# Patient Record
Sex: FEMALE | Race: WHITE | NOT HISPANIC OR LATINO | Employment: FULL TIME | URBAN - METROPOLITAN AREA
[De-identification: names, ages, dates, MRNs, and addresses within clinical notes are randomized per-mention and may not be internally consistent; named-entity substitution may affect disease eponyms.]

---

## 2017-01-20 ENCOUNTER — APPOINTMENT (OUTPATIENT)
Dept: PREADMISSION TESTING | Facility: HOSPITAL | Age: 52
End: 2017-01-20

## 2017-01-20 VITALS
OXYGEN SATURATION: 97 % | HEIGHT: 66 IN | SYSTOLIC BLOOD PRESSURE: 124 MMHG | HEART RATE: 66 BPM | BODY MASS INDEX: 29.54 KG/M2 | TEMPERATURE: 97.7 F | WEIGHT: 183.8 LBS | RESPIRATION RATE: 16 BRPM | DIASTOLIC BLOOD PRESSURE: 83 MMHG

## 2017-01-20 LAB
ALBUMIN SERPL-MCNC: 4.5 G/DL (ref 3.5–5.2)
ALBUMIN/GLOB SERPL: 1.6 G/DL
ALP SERPL-CCNC: 63 U/L (ref 39–117)
ALT SERPL W P-5'-P-CCNC: 16 U/L (ref 1–33)
ANION GAP SERPL CALCULATED.3IONS-SCNC: 14.2 MMOL/L
AST SERPL-CCNC: 12 U/L (ref 1–32)
BASOPHILS # BLD AUTO: 0.03 10*3/MM3 (ref 0–0.2)
BASOPHILS NFR BLD AUTO: 0.3 % (ref 0–1.5)
BILIRUB SERPL-MCNC: 0.5 MG/DL (ref 0.1–1.2)
BUN BLD-MCNC: 12 MG/DL (ref 6–20)
BUN/CREAT SERPL: 15.6 (ref 7–25)
CALCIUM SPEC-SCNC: 9.5 MG/DL (ref 8.6–10.5)
CHLORIDE SERPL-SCNC: 99 MMOL/L (ref 98–107)
CO2 SERPL-SCNC: 25.8 MMOL/L (ref 22–29)
CREAT BLD-MCNC: 0.77 MG/DL (ref 0.57–1)
DEPRECATED RDW RBC AUTO: 41.8 FL (ref 37–54)
EOSINOPHIL # BLD AUTO: 0.11 10*3/MM3 (ref 0–0.7)
EOSINOPHIL NFR BLD AUTO: 1.2 % (ref 0.3–6.2)
ERYTHROCYTE [DISTWIDTH] IN BLOOD BY AUTOMATED COUNT: 12.5 % (ref 11.7–13)
GFR SERPL CREATININE-BSD FRML MDRD: 78 ML/MIN/1.73
GLOBULIN UR ELPH-MCNC: 2.8 GM/DL
GLUCOSE BLD-MCNC: 96 MG/DL (ref 65–99)
HCT VFR BLD AUTO: 43 % (ref 35.6–45.5)
HGB BLD-MCNC: 14.6 G/DL (ref 11.9–15.5)
IMM GRANULOCYTES # BLD: 0.07 10*3/MM3 (ref 0–0.03)
IMM GRANULOCYTES NFR BLD: 0.8 % (ref 0–0.5)
LYMPHOCYTES # BLD AUTO: 2.79 10*3/MM3 (ref 0.9–4.8)
LYMPHOCYTES NFR BLD AUTO: 30.5 % (ref 19.6–45.3)
MCH RBC QN AUTO: 31.7 PG (ref 26.9–32)
MCHC RBC AUTO-ENTMCNC: 34 G/DL (ref 32.4–36.3)
MCV RBC AUTO: 93.3 FL (ref 80.5–98.2)
MONOCYTES # BLD AUTO: 0.75 10*3/MM3 (ref 0.2–1.2)
MONOCYTES NFR BLD AUTO: 8.2 % (ref 5–12)
NEUTROPHILS # BLD AUTO: 5.39 10*3/MM3 (ref 1.9–8.1)
NEUTROPHILS NFR BLD AUTO: 59 % (ref 42.7–76)
PLATELET # BLD AUTO: 249 10*3/MM3 (ref 140–500)
PMV BLD AUTO: 9.8 FL (ref 6–12)
POTASSIUM BLD-SCNC: 3.8 MMOL/L (ref 3.5–5.2)
PROT SERPL-MCNC: 7.3 G/DL (ref 6–8.5)
RBC # BLD AUTO: 4.61 10*6/MM3 (ref 3.9–5.2)
SODIUM BLD-SCNC: 139 MMOL/L (ref 136–145)
WBC NRBC COR # BLD: 9.14 10*3/MM3 (ref 4.5–10.7)

## 2017-01-20 PROCEDURE — 93005 ELECTROCARDIOGRAM TRACING: CPT

## 2017-01-20 PROCEDURE — 85025 COMPLETE CBC W/AUTO DIFF WBC: CPT | Performed by: ORTHOPAEDIC SURGERY

## 2017-01-20 PROCEDURE — 80053 COMPREHEN METABOLIC PANEL: CPT | Performed by: ORTHOPAEDIC SURGERY

## 2017-01-20 PROCEDURE — 36415 COLL VENOUS BLD VENIPUNCTURE: CPT

## 2017-01-20 PROCEDURE — 93010 ELECTROCARDIOGRAM REPORT: CPT | Performed by: INTERNAL MEDICINE

## 2017-01-20 NOTE — DISCHARGE INSTRUCTIONS
YOU WILL RECEIVE A CALL THE DAY BEFORE YOUR PROCEDURE AND GIVEN AN ARRIVAL TIME FOR 1/25/2017      Take the following medications the morning of surgery with a small sip of water.  NO MEDS        General Instructions:  • Do not eat or drink after midnight: includes water, mints, or gum. You may brush your teeth.  • Do not smoke, chew tobacco, or drink alcohol.  • Bring medications in original bottles, any inhalers and if applicable your C-PAP/ BI-PAP machine.  • Bring any papers given to you in the doctor’s office.  • Wear clean comfortable clothes and socks.  • Do not wear contact lenses or make-up.  Bring a case for your glasses if applicable.   • Bring crutches or walker if applicable.  • Leave all other valuables and jewelry at home.        Preventing a Surgical Site Infection:  Shower on the morning of surgery using a fresh bar of anti-bacterial soap (such as Dial) and clean washcloth.  Dry with a clean towel and dress in clean clothing.  For 2 to 3 days before surgery, avoid shaving with a razor near where you will have surgery because the razor can irritate skin and make it easier to develop an infection  Ask your surgeon if you will be receiving antibiotics prior to surgery  Make sure you, your family, and all healthcare providers clean their hands with soap and water or an alcohol based hand  before caring for you or your wound  If at all possible, quit smoking as many days before surgery as you can.    Day of surgery:  Upon arrival, a Pre-op nurse and Anesthesiologist will review your health history, obtain vital signs, and answer questions you may have.  The only belongings needed at this time will be your home medications and if applicable your C-PAP/BI-PAP machine.  If you are staying overnight your family can leave the rest of your belongings in the car and bring them to your room later.  A Pre-op nurse will start an IV and you may receive medication in preparation for surgery, including  something to help you relax.  Your family will be able to see you in the Pre-op area.  While you are in surgery your family should notify the waiting room  if they leave the waiting room area and provide a contact phone number.    Please be aware that surgery does come with discomfort.  We want to make every effort to control your discomfort so please discuss any uncontrolled symptoms with your nurse.   Your doctor will most likely have prescribed pain medications.      If you are going home after surgery you will receive individualized written care instructions before being discharged.  A responsible adult must drive you to and from the hospital on the day of your surgery and stay with you for 24 hours.    If you are staying overnight following surgery, you will be transported to your hospital room following the recovery period.  [facility] has all private rooms.    If you have any questions please call Pre-Admission Testing at [phone].  Deductibles and co-payments are collected on the day of service. Please be prepared to pay the required co-pay, deductible or deposit on the day of service as defined by your plan.

## 2017-01-20 NOTE — MR AVS SNAPSHOT
"                        Alem Arzate   1/20/2017 9:30 AM   Appointment    Provider:  VIVIANA Ballesteros   Department:  [facility] PREADMISSION T   Dept Phone:  [phone]                Your Full Care Plan              Your Updated Medication List          This list is accurate as of: 1/20/17  9:42 AM.  Always use your most recent med list.                MULTI COMPLETE PO               MyChart Signup     Our records indicate that you have an active [facility] [employer]Yu account.    You can view your After Visit Summary by going to [employer] and logging in with your UniYu username and password.  If you don't have a UniYu username and password but a parent or guardian has access to your record, the parent or guardian should login with their own UniYu username and password and access your record to view the After Visit Summary.    If you have questions, you can email [email or web address] or call [phone] to talk to our UniYu staff.  Remember, UniYu is NOT to be used for urgent needs.  For medical emergencies, dial 911.               Other Info from Your Visit           Allergies     Codeine Nausea Only      Vital Signs     Blood Pressure Pulse Temperature Respirations Height Weight    124/83 (BP Location: Right arm, Patient Position: Sitting) 66 97.7 °F (36.5 °C) (Oral) 16 66\" (167.6 cm) 183 lb 12.8 oz (83.4 kg)    Last Menstrual Period Oxygen Saturation Body Mass Index Smoking Status          12/10/2016 (Exact Date) 97% 29.67 kg/m2 Never Smoker          Discharge Instructions       YOU WILL RECEIVE A CALL THE DAY BEFORE YOUR PROCEDURE AND GIVEN AN ARRIVAL TIME FOR 1/25/2017      Take the following medications the morning of surgery with a small sip of water.  NO MEDS        General Instructions:  • Do not eat or drink after midnight: includes water, mints, or gum. You may brush your teeth.  • Do not smoke, chew tobacco, or drink alcohol.  • Bring medications in " original bottles, any inhalers and if applicable your C-PAP/ BI-PAP machine.  • Bring any papers given to you in the doctor’s office.  • Wear clean comfortable clothes and socks.  • Do not wear contact lenses or make-up.  Bring a case for your glasses if applicable.   • Bring crutches or walker if applicable.  • Leave all other valuables and jewelry at home.        Preventing a Surgical Site Infection:  Shower on the morning of surgery using a fresh bar of anti-bacterial soap (such as Dial) and clean washcloth.  Dry with a clean towel and dress in clean clothing.  For 2 to 3 days before surgery, avoid shaving with a razor near where you will have surgery because the razor can irritate skin and make it easier to develop an infection  Ask your surgeon if you will be receiving antibiotics prior to surgery  Make sure you, your family, and all healthcare providers clean their hands with soap and water or an alcohol based hand  before caring for you or your wound  If at all possible, quit smoking as many days before surgery as you can.    Day of surgery:  Upon arrival, a Pre-op nurse and Anesthesiologist will review your health history, obtain vital signs, and answer questions you may have.  The only belongings needed at this time will be your home medications and if applicable your C-PAP/BI-PAP machine.  If you are staying overnight your family can leave the rest of your belongings in the car and bring them to your room later.  A Pre-op nurse will start an IV and you may receive medication in preparation for surgery, including something to help you relax.  Your family will be able to see you in the Pre-op area.  While you are in surgery your family should notify the waiting room  if they leave the waiting room area and provide a contact phone number.    Please be aware that surgery does come with discomfort.  We want to make every effort to control your discomfort so please discuss any uncontrolled  symptoms with your nurse.   Your doctor will most likely have prescribed pain medications.      If you are going home after surgery you will receive individualized written care instructions before being discharged.  A responsible adult must drive you to and from the hospital on the day of your surgery and stay with you for 24 hours.    If you are staying overnight following surgery, you will be transported to your hospital room following the recovery period.  [facility] has all private rooms.    If you have any questions please call Pre-Admission Testing at [phone].  Deductibles and co-payments are collected on the day of service. Please be prepared to pay the required co-pay, deductible or deposit on the day of service as defined by your plan.       SYMPTOMS OF A STROKE    Call 911 or have someone take you to the Emergency Department if you have any of the following:    · Sudden numbness or weakness of your face, arm or leg especially on one side of the body  · Sudden confusion, diffiiculty speaking or trouble understanding   · Changes in your vision or loss of sight in one eye  · Sudden severe headache with no known cause  · sudden dizziness, trouble walking, loss of balance or coordination    It is important to seek emergency care right away if you have further stroke symptoms. If you get emergency help quickly, the powerful clot-dissolving medicines can reduce the disabilities caused by a stroke.     For more information:    American Stroke Association  1-918-9-STROKE  www.strokeassociation.org           IF YOU SMOKE OR USE TOBACCO PLEASE READ THE FOLLOWING:    Why is smoking bad for me?  Smoking increases the risk of heart disease, lung disease, vascular disease, stroke, and cancer.     If you smoke, STOP!    If you would like more information on quitting smoking, please visit the [facility] [employer] website: [email or web address]   This link will provide  additional resources including the QUIT line and the Beat the Pack support groups.     For more information:    American Cancer Society  ([phone] 522-5348    American Heart Association  1-125.956.4374

## 2017-01-25 ENCOUNTER — ANESTHESIA (OUTPATIENT)
Dept: PERIOP | Facility: HOSPITAL | Age: 52
End: 2017-01-25

## 2017-01-25 ENCOUNTER — ANESTHESIA EVENT (OUTPATIENT)
Dept: PERIOP | Facility: HOSPITAL | Age: 52
End: 2017-01-25

## 2017-01-25 ENCOUNTER — HOSPITAL ENCOUNTER (OUTPATIENT)
Facility: HOSPITAL | Age: 52
Setting detail: HOSPITAL OUTPATIENT SURGERY
Discharge: HOME | End: 2017-01-25
Attending: ORTHOPAEDIC SURGERY | Admitting: ORTHOPAEDIC SURGERY

## 2017-01-25 VITALS
OXYGEN SATURATION: 97 % | WEIGHT: 183 LBS | HEIGHT: 66 IN | BODY MASS INDEX: 29.41 KG/M2 | HEART RATE: 65 BPM | TEMPERATURE: 97.2 F | RESPIRATION RATE: 16 BRPM | DIASTOLIC BLOOD PRESSURE: 85 MMHG | SYSTOLIC BLOOD PRESSURE: 123 MMHG

## 2017-01-25 LAB
B-HCG UR QL: NEGATIVE
INTERNAL NEGATIVE CONTROL: NORMAL
INTERNAL POSITIVE CONTROL: NORMAL
Lab: NORMAL

## 2017-01-25 PROCEDURE — 25010000002 KETOROLAC TROMETHAMINE PER 15 MG: Performed by: NURSE ANESTHETIST, CERTIFIED REGISTERED

## 2017-01-25 PROCEDURE — 25010000002 MIDAZOLAM PER 1 MG: Performed by: ANESTHESIOLOGY

## 2017-01-25 PROCEDURE — 25010000002 PROPOFOL 10 MG/ML EMULSION: Performed by: NURSE ANESTHETIST, CERTIFIED REGISTERED

## 2017-01-25 PROCEDURE — 25010000002 ONDANSETRON PER 1 MG: Performed by: NURSE ANESTHETIST, CERTIFIED REGISTERED

## 2017-01-25 PROCEDURE — 25010000002 FENTANYL CITRATE (PF) 100 MCG/2ML SOLUTION: Performed by: NURSE ANESTHETIST, CERTIFIED REGISTERED

## 2017-01-25 PROCEDURE — 25010000003 CEFAZOLIN IN DEXTROSE 2-4 GM/100ML-% SOLUTION: Performed by: ORTHOPAEDIC SURGERY

## 2017-01-25 PROCEDURE — 25010000002 DEXAMETHASONE PER 1 MG: Performed by: NURSE ANESTHETIST, CERTIFIED REGISTERED

## 2017-01-25 RX ORDER — BUPIVACAINE HYDROCHLORIDE AND EPINEPHRINE 5; 5 MG/ML; UG/ML
INJECTION, SOLUTION PERINEURAL AS NEEDED
Status: DISCONTINUED | OUTPATIENT
Start: 2017-01-25 | End: 2017-01-25 | Stop reason: HOSPADM

## 2017-01-25 RX ORDER — HYDRALAZINE HYDROCHLORIDE 20 MG/ML
5 INJECTION INTRAMUSCULAR; INTRAVENOUS
Status: DISCONTINUED | OUTPATIENT
Start: 2017-01-25 | End: 2017-01-26 | Stop reason: HOSPADM

## 2017-01-25 RX ORDER — DIPHENHYDRAMINE HYDROCHLORIDE 50 MG/ML
12.5 INJECTION INTRAMUSCULAR; INTRAVENOUS
Status: DISCONTINUED | OUTPATIENT
Start: 2017-01-25 | End: 2017-01-26 | Stop reason: HOSPADM

## 2017-01-25 RX ORDER — LIDOCAINE HYDROCHLORIDE 20 MG/ML
INJECTION, SOLUTION INFILTRATION; PERINEURAL AS NEEDED
Status: DISCONTINUED | OUTPATIENT
Start: 2017-01-25 | End: 2017-01-25 | Stop reason: SURG

## 2017-01-25 RX ORDER — HYDROMORPHONE HYDROCHLORIDE 1 MG/ML
0.5 INJECTION, SOLUTION INTRAMUSCULAR; INTRAVENOUS; SUBCUTANEOUS
Status: DISCONTINUED | OUTPATIENT
Start: 2017-01-25 | End: 2017-01-26 | Stop reason: HOSPADM

## 2017-01-25 RX ORDER — KETOROLAC TROMETHAMINE 30 MG/ML
INJECTION, SOLUTION INTRAMUSCULAR; INTRAVENOUS AS NEEDED
Status: DISCONTINUED | OUTPATIENT
Start: 2017-01-25 | End: 2017-01-25 | Stop reason: SURG

## 2017-01-25 RX ORDER — PROMETHAZINE HYDROCHLORIDE 25 MG/1
25 SUPPOSITORY RECTAL ONCE AS NEEDED
Status: DISCONTINUED | OUTPATIENT
Start: 2017-01-25 | End: 2017-01-26 | Stop reason: HOSPADM

## 2017-01-25 RX ORDER — MIDAZOLAM HYDROCHLORIDE 1 MG/ML
2 INJECTION INTRAMUSCULAR; INTRAVENOUS
Status: DISCONTINUED | OUTPATIENT
Start: 2017-01-25 | End: 2017-01-25 | Stop reason: HOSPADM

## 2017-01-25 RX ORDER — PROMETHAZINE HYDROCHLORIDE 25 MG/ML
12.5 INJECTION, SOLUTION INTRAMUSCULAR; INTRAVENOUS ONCE AS NEEDED
Status: DISCONTINUED | OUTPATIENT
Start: 2017-01-25 | End: 2017-01-26 | Stop reason: HOSPADM

## 2017-01-25 RX ORDER — LABETALOL HYDROCHLORIDE 5 MG/ML
5 INJECTION, SOLUTION INTRAVENOUS
Status: DISCONTINUED | OUTPATIENT
Start: 2017-01-25 | End: 2017-01-26 | Stop reason: HOSPADM

## 2017-01-25 RX ORDER — PROPOFOL 10 MG/ML
VIAL (ML) INTRAVENOUS AS NEEDED
Status: DISCONTINUED | OUTPATIENT
Start: 2017-01-25 | End: 2017-01-25 | Stop reason: SURG

## 2017-01-25 RX ORDER — FENTANYL CITRATE 50 UG/ML
50 INJECTION, SOLUTION INTRAMUSCULAR; INTRAVENOUS
Status: DISCONTINUED | OUTPATIENT
Start: 2017-01-25 | End: 2017-01-25 | Stop reason: HOSPADM

## 2017-01-25 RX ORDER — ONDANSETRON 2 MG/ML
4 INJECTION INTRAMUSCULAR; INTRAVENOUS ONCE AS NEEDED
Status: DISCONTINUED | OUTPATIENT
Start: 2017-01-25 | End: 2017-01-26 | Stop reason: HOSPADM

## 2017-01-25 RX ORDER — SODIUM CHLORIDE, SODIUM LACTATE, POTASSIUM CHLORIDE, AND CALCIUM CHLORIDE .6; .31; .03; .02 G/100ML; G/100ML; G/100ML; G/100ML
IRRIGANT IRRIGATION AS NEEDED
Status: DISCONTINUED | OUTPATIENT
Start: 2017-01-25 | End: 2017-01-25 | Stop reason: HOSPADM

## 2017-01-25 RX ORDER — PROMETHAZINE HYDROCHLORIDE 25 MG/1
25 TABLET ORAL ONCE AS NEEDED
Status: DISCONTINUED | OUTPATIENT
Start: 2017-01-25 | End: 2017-01-26 | Stop reason: HOSPADM

## 2017-01-25 RX ORDER — PROMETHAZINE HYDROCHLORIDE 25 MG/1
12.5 TABLET ORAL ONCE AS NEEDED
Status: DISCONTINUED | OUTPATIENT
Start: 2017-01-25 | End: 2017-01-26 | Stop reason: HOSPADM

## 2017-01-25 RX ORDER — DEXAMETHASONE SODIUM PHOSPHATE 10 MG/ML
INJECTION INTRAMUSCULAR; INTRAVENOUS AS NEEDED
Status: DISCONTINUED | OUTPATIENT
Start: 2017-01-25 | End: 2017-01-25 | Stop reason: SURG

## 2017-01-25 RX ORDER — FENTANYL CITRATE 50 UG/ML
INJECTION, SOLUTION INTRAMUSCULAR; INTRAVENOUS AS NEEDED
Status: DISCONTINUED | OUTPATIENT
Start: 2017-01-25 | End: 2017-01-25 | Stop reason: SURG

## 2017-01-25 RX ORDER — HYDROMORPHONE HYDROCHLORIDE 1 MG/ML
0.25 INJECTION, SOLUTION INTRAMUSCULAR; INTRAVENOUS; SUBCUTANEOUS
Status: DISCONTINUED | OUTPATIENT
Start: 2017-01-25 | End: 2017-01-26 | Stop reason: HOSPADM

## 2017-01-25 RX ORDER — HYDROCODONE BITARTRATE AND ACETAMINOPHEN 5; 325 MG/1; MG/1
1-2 TABLET ORAL EVERY 4 HOURS PRN
Qty: 40 TABLET | Refills: 0 | Status: SHIPPED | OUTPATIENT
Start: 2017-01-25 | End: 2019-07-29

## 2017-01-25 RX ORDER — SODIUM CHLORIDE, SODIUM LACTATE, POTASSIUM CHLORIDE, CALCIUM CHLORIDE 600; 310; 30; 20 MG/100ML; MG/100ML; MG/100ML; MG/100ML
9 INJECTION, SOLUTION INTRAVENOUS CONTINUOUS
Status: DISCONTINUED | OUTPATIENT
Start: 2017-01-25 | End: 2017-01-26 | Stop reason: HOSPADM

## 2017-01-25 RX ORDER — OXYCODONE AND ACETAMINOPHEN 7.5; 325 MG/1; MG/1
1 TABLET ORAL ONCE AS NEEDED
Status: COMPLETED | OUTPATIENT
Start: 2017-01-25 | End: 2017-01-25

## 2017-01-25 RX ORDER — NALOXONE HCL 0.4 MG/ML
0.2 VIAL (ML) INJECTION AS NEEDED
Status: DISCONTINUED | OUTPATIENT
Start: 2017-01-25 | End: 2017-01-26 | Stop reason: HOSPADM

## 2017-01-25 RX ORDER — ONDANSETRON 2 MG/ML
INJECTION INTRAMUSCULAR; INTRAVENOUS AS NEEDED
Status: DISCONTINUED | OUTPATIENT
Start: 2017-01-25 | End: 2017-01-25 | Stop reason: SURG

## 2017-01-25 RX ORDER — FENTANYL CITRATE 50 UG/ML
50 INJECTION, SOLUTION INTRAMUSCULAR; INTRAVENOUS
Status: DISCONTINUED | OUTPATIENT
Start: 2017-01-25 | End: 2017-01-26 | Stop reason: HOSPADM

## 2017-01-25 RX ORDER — CEFAZOLIN SODIUM 2 G/100ML
2 INJECTION, SOLUTION INTRAVENOUS ONCE
Status: COMPLETED | OUTPATIENT
Start: 2017-01-25 | End: 2017-01-25

## 2017-01-25 RX ORDER — MIDAZOLAM HYDROCHLORIDE 1 MG/ML
1 INJECTION INTRAMUSCULAR; INTRAVENOUS
Status: DISCONTINUED | OUTPATIENT
Start: 2017-01-25 | End: 2017-01-25 | Stop reason: HOSPADM

## 2017-01-25 RX ORDER — FAMOTIDINE 10 MG/ML
20 INJECTION, SOLUTION INTRAVENOUS ONCE
Status: COMPLETED | OUTPATIENT
Start: 2017-01-25 | End: 2017-01-25

## 2017-01-25 RX ORDER — SODIUM CHLORIDE 0.9 % (FLUSH) 0.9 %
1-10 SYRINGE (ML) INJECTION AS NEEDED
Status: DISCONTINUED | OUTPATIENT
Start: 2017-01-25 | End: 2017-01-25 | Stop reason: HOSPADM

## 2017-01-25 RX ORDER — HYDROCODONE BITARTRATE AND ACETAMINOPHEN 7.5; 325 MG/1; MG/1
1 TABLET ORAL ONCE AS NEEDED
Status: DISCONTINUED | OUTPATIENT
Start: 2017-01-25 | End: 2017-01-26 | Stop reason: HOSPADM

## 2017-01-25 RX ORDER — FLUMAZENIL 0.1 MG/ML
0.2 INJECTION INTRAVENOUS AS NEEDED
Status: DISCONTINUED | OUTPATIENT
Start: 2017-01-25 | End: 2017-01-26 | Stop reason: HOSPADM

## 2017-01-25 RX ADMIN — OXYCODONE HYDROCHLORIDE AND ACETAMINOPHEN 1 TABLET: 7.5; 325 TABLET ORAL at 12:02

## 2017-01-25 RX ADMIN — SODIUM CHLORIDE, POTASSIUM CHLORIDE, SODIUM LACTATE AND CALCIUM CHLORIDE 9 ML/HR: 600; 310; 30; 20 INJECTION, SOLUTION INTRAVENOUS at 10:19

## 2017-01-25 RX ADMIN — LIDOCAINE HYDROCHLORIDE 100 MG: 20 INJECTION, SOLUTION INFILTRATION; PERINEURAL at 10:34

## 2017-01-25 RX ADMIN — FAMOTIDINE 20 MG: 10 INJECTION, SOLUTION INTRAVENOUS at 10:25

## 2017-01-25 RX ADMIN — CEFAZOLIN SODIUM 2 G: 2 INJECTION, SOLUTION INTRAVENOUS at 10:31

## 2017-01-25 RX ADMIN — ONDANSETRON 4 MG: 2 INJECTION INTRAMUSCULAR; INTRAVENOUS at 10:44

## 2017-01-25 RX ADMIN — DEXAMETHASONE SODIUM PHOSPHATE 8 MG: 10 INJECTION INTRAMUSCULAR; INTRAVENOUS at 10:44

## 2017-01-25 RX ADMIN — MIDAZOLAM 2 MG: 1 INJECTION INTRAMUSCULAR; INTRAVENOUS at 10:26

## 2017-01-25 RX ADMIN — FENTANYL CITRATE 50 MCG: 50 INJECTION INTRAMUSCULAR; INTRAVENOUS at 10:40

## 2017-01-25 RX ADMIN — KETOROLAC TROMETHAMINE 30 MG: 30 INJECTION, SOLUTION INTRAMUSCULAR; INTRAVENOUS at 11:04

## 2017-01-25 RX ADMIN — PROPOFOL 200 MG: 10 INJECTION, EMULSION INTRAVENOUS at 10:34

## 2017-01-25 NOTE — IP AVS SNAPSHOT
AFTER VISIT SUMMARY             Alem Arzate           About your hospitalization     You were admitted on:  January 25, 2017 You last received care in the:  [facility] OR       Procedures & Surgeries      Procedure(s) (LRB):  LEFT KNEE ARTHROSCOPY WITH CHONDROPLASTY  AND  DRILLING (Left)     1/25/2017     Surgeon(s):  Colt Fournier MD  -------------------      Medications    If you or your caregiver advised us that you are currently taking a medication and that medication is marked below as “Resume”, this simply indicates that we have reviewed those medications to make sure our new therapy recommendations do not interfere.  If you have concerns about medications other than those new ones which we are prescribing today, please consult the physician who prescribed them (or your primary physician).  Our review of your home medications is not meant to indicate that we are directing their use.             Your Medications      START taking these medications     HYDROcodone-acetaminophen 5-325 MG per tablet   Take 1-2 tablets by mouth Every 4 (Four) Hours As Needed (pain).   Commonly known as:  NORCO             CONTINUE taking these medications     MULTI COMPLETE PO   Take 1 tablet by mouth Daily.                Where to Get Your Medications      You can get these medications from any pharmacy     Bring a paper prescription for each of these medications     HYDROcodone-acetaminophen 5-325 MG per tablet                  Your Medications      Your Medication List           Morning Noon Evening Bedtime As Needed    HYDROcodone-acetaminophen 5-325 MG per tablet   Take 1-2 tablets by mouth Every 4 (Four) Hours As Needed (pain).   Commonly known as:  NORCO                                MULTI COMPLETE PO   Take 1 tablet by mouth Daily.                                         Instructions for After Discharge        Activity Instructions     Discharge Activity       1) No driving until no longer taking  narcotics.   2) weight bear as tolerated with crutches  3) May shower / sponge bathe post op day 2               Discharge References/Attachments     GENERAL ANESTHESIA, ADULT, CARE AFTER (ENGLISH)       Follow-ups for After Discharge        Follow-up Information     Follow up with Chad Uribe MD .    Specialty:  Family Medicine    Contact information:    [address] [place] [address]  [phone]        Referrals and Follow-ups to Schedule     Dressing Change Instructions    As directed    Dressing change: remove dressing after 24 hours and then cover with band aids daily  Ice:  You may ice 20-30 minutes every 2-3 hours       Return to Clinic for Follow Up    As directed    Call [facility] to schedule your follow up appointment   Follow Up Details:  1 week             [employer] Signup     Our records indicate that you have an active [employer] account.    You can view your After Visit Summary by going to [employer] and logging in with your Starfish [employer] username and password.  If you don't have a Star[employer] username and password but a parent or guardian has access to your record, the parent or guardian should login with their own Star[employer] username and password and access your record to view the After Visit Summary.    If you have questions, you can email [email or web address] or call [phone] to talk to our Star[employer] staff.  Remember, [employer] is NOT to be used for urgent needs.  For medical emergencies, dial 911.           Summary of Your Hospitalization        Reason for Hospitalization     Your primary diagnosis was:  Not on File      Care Providers     Provider Service Role Specialty    Colt Fournier MD Surgery Orthopedic Attending Provider Orthopedic Surgery    Colt Fournier MD Surgery Orthopedic Surgeon  Orthopedic Surgery      Your Allergies  Date Reviewed: 1/25/2017    Allergen Reactions    Codeine Nausea Only      Pending Labs      Order Current Status    POC Urine Pregnancy Test In process      Patient Belongings Returned     Document Return of Belongings Flowsheet     Were the patient bedside belongings sent home?   --   Belongings Retrieved from Security & Sent Home   --    Belongings Sent to Safe   --   Medications Retrieved from Pharmacy & Sent Home   --              More Information      General Anesthesia, Adult, Care After  Refer to this sheet in the next few weeks. These instructions provide you with information on caring for yourself after your procedure. Your health care provider may also give you more specific instructions. Your treatment has been planned according to current medical practices, but problems sometimes occur. Call your health care provider if you have any problems or questions after your procedure.  WHAT TO EXPECT AFTER THE PROCEDURE  After the procedure, it is typical to experience:  · Sleepiness.  · Nausea and vomiting.  HOME CARE INSTRUCTIONS  · For the first 24 hours after general anesthesia:  ¨ Have a responsible person with you.  ¨ Do not drive a car. If you are alone, do not take public transportation.  ¨ Do not drink alcohol.  ¨ Do not take medicine that has not been prescribed by your health care provider.  ¨ Do not sign important papers or make important decisions.  ¨ You may resume a normal diet and activities as directed by your health care provider.  · Change bandages (dressings) as directed.  · If you have questions or problems that seem related to general anesthesia, call the hospital and ask for the anesthetist or anesthesiologist on call.  SEEK MEDICAL CARE IF:  · You have nausea and vomiting that continue the day after anesthesia.  · You develop a rash.  SEEK IMMEDIATE MEDICAL CARE IF:   · You have difficulty breathing.  · You have chest pain.  · You have any allergic problems.     This information is not intended to replace advice given to you by your health care provider. Make sure you discuss  any questions you have with your health care provider.     Document Released: 03/26/2002 Document Revised: 01/08/2016 Document Reviewed: 04/17/2013  [employer] Interactive Patient Education ©2016 [employer]         PREVENTING SURGICAL SITE INFECTIONS     Surgical Site Infections FAQs  What is a Surgical Site Infection (SSI)?  A surgical site infection is an infection that occurs after surgery in the part of the body where the surgery took place. Most patients who have surgery do not develop an infection. However, infections develop in about 1 to 3 out of every 100 patients who have surgery.  Some of the common symptoms of a surgical site infection are:  · Redness and pain around the area where you had surgery  · Drainage of cloudy fluid from your surgical wound  · Fever  Can SSIs be treated?  Yes. Most surgical site infections can be treated with antibiotics. The antibiotic given to you depends on the bacteria (germs) causing the infection. Sometimes patients with SSIs also need another surgery to treat the infection.  What are some of the things that hospitals are doing to prevent SSIs?  To prevent SSIs, doctors, nurses, and other healthcare providers:  · Clean their hands and arms up to their elbows with an antiseptic agent just before the surgery.  · Clean their hands with soap and water or an alcohol-based hand rub before and after caring for each patient.  · May remove some of your hair immediately before your surgery using electric clippers if the hair is in the same area where the procedure will occur. They should not shave you with a razor.  · Wear special hair covers, masks, gowns, and gloves during surgery to keep the surgery area clean.  · Give you antibiotics before your surgery starts. In most cases, you should get antibiotics within 60 minutes before the surgery starts and the antibiotics should be stopped within 24 hours after surgery.  · Clean the skin at the site of your surgery with a special soap  that kills germs.  What can I do to help prevent SSIs?  Before your surgery:  · Tell your doctor about other medical problems you may have. Health problems such as allergies, diabetes, and obesity could affect your surgery and your treatment.  · Quit smoking. Patients who smoke get more infections. Talk to your doctor about how you can quit before your surgery.  · Do not shave near where you will have surgery. Shaving with a razor can irritate your skin and make it easier to develop an infection.  At the time of your surgery:  · Speak up if someone tries to shave you with a razor before surgery. Ask why you need to be shaved and talk with your surgeon if you have any concerns.  · Ask if you will get antibiotics before surgery.  After your surgery:  · Make sure that your healthcare providers clean their hands before examining you, either with soap and water or an alcohol-based hand rub.    If you do not see your providers clean their hands, please ask them to do so.  · Family and friends who visit you should not touch the surgical wound or dressings.  · Family and friends should clean their hands with soap and water or an alcohol-based hand rub before and after visiting you. If you do not see them clean their hands, ask them to clean their hands.  What do I need to do when I go home from the hospital?  · Before you go home, your doctor or nurse should explain everything you need to know about taking care of your wound. Make sure you understand how to care for your wound before you leave the hospital.  · Always clean your hands before and after caring for your wound.  · Before you go home, make sure you know who to contact if you have questions or problems after you get home.  · If you have any symptoms of an infection, such as redness and pain at the surgery site, drainage, or fever, call your doctor immediately.  If you have additional questions, please ask your doctor or nurse.  Developed and co-sponsored by The  Society for Healthcare Epidemiology of [place] (SHEA); Infectious Diseases Society of [place] (IDSA); American Hospital Association; Association for Professionals in Infection Control and Epidemiology (APIC); Centers for Disease Control and Prevention (CDC); and The Joint Commission.     This information is not intended to replace advice given to you by your health care provider. Make sure you discuss any questions you have with your health care provider.     Document Released: 12/23/2014 Document Revised: 01/08/2016 Document Reviewed: 03/02/2016  [employer]System[employer] Interactive Patient Education ©2016 [employer] Inc.             SYMPTOMS OF A STROKE    Call 911 or have someone take you to the Emergency Department if you have any of the following:    · Sudden numbness or weakness of your face, arm or leg especially on one side of the body  · Sudden confusion, diffiiculty speaking or trouble understanding   · Changes in your vision or loss of sight in one eye  · Sudden severe headache with no known cause  · sudden dizziness, trouble walking, loss of balance or coordination    It is important to seek emergency care right away if you have further stroke symptoms. If you get emergency help quickly, the powerful clot-dissolving medicines can reduce the disabilities caused by a stroke.     For more information:    American Stroke Association  9-316-7-STROKE  www.strokeassociation.org           IF YOU SMOKE OR USE TOBACCO PLEASE READ THE FOLLOWING:    Why is smoking bad for me?  Smoking increases the risk of heart disease, lung disease, vascular disease, stroke, and cancer.     If you smoke, STOP!    If you would like more information on quitting smoking, please visit the [employer] website: [email or web address]   This link will provide additional resources including the QUIT line and the Beat the Pack support groups.     For more information:    American Cancer Society  (502)  154-8130    American Heart Association  5-580-443-2874               YOU ARE THE MOST IMPORTANT FACTOR IN YOUR RECOVERY.     Follow all instructions carefully.     I have reviewed my discharge instructions with my nurse, including the following information, if applicable:     Information about my illness and diagnosis   Follow up appointments (including lab draws)   Wound Care   Equipment Needs   Medications (new and continuing) along with side effects   Preventative information such as vaccines and smoking cessations   Diet   Pain   I know when to contact my Doctor's office or seek emergency care      I want my nurse to describe the side effects of my medications: YES NO   If the answer is no, I understand the side effects of my medications: YES NO   My nurse described the side effects of my medications in a way that I could understand: YES NO   I have taken my personal belongings and my own medications with me at discharge: YES NO            I have received this information and my questions have been answered. I have discussed any concerns I see with this plan with the nurse or physician. I understand these instructions.    Signature of Patient or Responsible Person: _____________________________________    Date: _________________  Time: __________________    Signature of Healthcare Provider: _______________________________________  Date: _________________  Time: __________________

## 2017-01-25 NOTE — OP NOTE
DATE OF PROCEDURE:  01/25/2017    PREOPERATIVE DIAGNOSIS:  Chondromalacia of patella.     POSTOPERATIVE DIAGNOSES:  1.  Chondromalacia of the patella.   2.  Chondral fracture medial femoral condyle     PROCEDURES PERFORMED:  1.  Left knee arthroscopy.   2.  Chondroplasty with trephinations of femoral condyle.   3.  Extensor chondroplasty of patellofemoral joint.     SURGEON: Colt Fournier MD      ASSISTANT: None.    ANESTHESIA: General, plus postoperative local. Tourniquet time: 18 minutes.     COMPLICATIONS: None.     SPECIMENS: None sent.     DESCRIPTION OF PROCEDURE: Following induction of anesthesia, the patient had a tourniquet placed on the left upper thigh. The leg was placed in a leg ang and prepped and draped in a sterile fashion. We then wrapped the entire limb with elastic bands and inflated the tourniquet to 250 mmHg pressure. I first established the inferolateral portal and inserted the scope into the joint. She was found to have extensive chondromalacia of the femoral trochlea and the medial and lateral facets of the patella. I established the medial portal and inserted the shaver and essentially debrided the chondral flaps from both the femoral trochlea and the undersurface of the patella. I also removed a portion of the fat pad to allow better visualization of the medial compartment. She was found to have an intact meniscus, but there was a chondral fracture in the anterior weight-bearing aspect of the medial femoral condyle. I debrided this and then performed a trephinations was a small chondral pick performing 4  linear trephinations and then debriding again using the shaver.  The lateral meniscus was also found to be intact. I then removed the cannulas and closed the portals with 3-0 nylon and infiltrating the knee with a solution of Marcaine and morphine. She was placed into a soft, sterile dressing and into her postop ice wrap. She will be discharged to recovery and then to home. Prognosis  is good.          Colt Fournier M.D.*  TER:mamie  D:   01/25/2017 11:18:13  T:   01/25/2017 14:44:20  Job ID:   [id number]  Document ID:   [id number]  cc:

## 2017-01-25 NOTE — PLAN OF CARE
Problem: Patient Care Overview (Adult)  Goal: Plan of Care Review  Outcome: Ongoing (interventions implemented as appropriate)    01/25/17 1210   Coping/Psychosocial Response Interventions   Plan Of Care Reviewed With patient   Patient Care Overview   Progress improving       Goal: Adult Individualization and Mutuality  Outcome: Ongoing (interventions implemented as appropriate)  Goal: Discharge Needs Assessment  Outcome: Ongoing (interventions implemented as appropriate)    01/25/17 1210   Discharge Needs Assessment   Concerns To Be Addressed no discharge needs identified

## 2017-01-25 NOTE — PLAN OF CARE
Problem: Perioperative Period (Adult)  Goal: Signs and Symptoms of Listed Potential Problems Will be Absent or Manageable (Perioperative Period)  Outcome: Ongoing (interventions implemented as appropriate)    01/25/17 1209   Perioperative Period   Problems Assessed (Perioperative Period) all   Problems Present (Perioperative Period) none

## 2017-01-25 NOTE — ANESTHESIA PROCEDURE NOTES
Airway  Urgency: elective    Airway not difficult    General Information and Staff    Patient location during procedure: OR  Anesthesiologist: SASCHA BARKLEY  CRNA: SWAPNIL DERAS    Indications and Patient Condition  Indications for airway management: airway protection    Preoxygenated: yes  Mask difficulty assessment: 0 - not attempted    Final Airway Details  Final airway type: supraglottic airway      Successful airway: classic  Size 4    Number of attempts at approach: 1    Additional Comments  LMA inserted without difficulty.  Lips and teeth intact as preop condition.  No signs or symptoms of gastric regurgitation.  Seal with minimal pressure and secure.

## 2017-01-25 NOTE — ANESTHESIA PREPROCEDURE EVALUATION
Anesthesia Evaluation     Patient summary reviewed and Nursing notes reviewed    Airway   Mallampati: II  TM distance: <3 FB  Neck ROM: full  no difficulty expected  Dental - normal exam     Pulmonary - negative pulmonary ROS and normal exam   Cardiovascular - negative cardio ROS and normal exam    Rhythm: regular    Neuro/Psych- negative ROS  GI/Hepatic/Renal/Endo - negative ROS     Musculoskeletal     Abdominal  - normal exam    Bowel sounds: normal.   Substance History - negative use     OB/GYN negative ob/gyn ROS         Other   (+) arthritis                        Anesthesia Plan    ASA 1     general     intravenous induction   Anesthetic plan and risks discussed with patient.    Plan discussed with CRNA.

## 2017-01-25 NOTE — ANESTHESIA POSTPROCEDURE EVALUATION
Patient: Alme Arzate    Procedure Summary     Date Anesthesia Start Anesthesia Stop Room / Location    01/25/17 1031 1115  [facility]  /  [facility]       Procedure Diagnosis Surgeon Provider    LEFT KNEE ARTHROSCOPY WITH CHONDROPLASTY  AND  DRILLING (Left Knee) No diagnosis on file. MD Seven Marsh MD          Anesthesia Type: general  Last vitals  /85 (01/25/17 1243)    Temp      Pulse 65 (01/25/17 1243)   Resp 16 (01/25/17 1243)    SpO2 97 % (01/25/17 1243)      Post Anesthesia Care and Evaluation    Patient location during evaluation: bedside  Patient participation: complete - patient participated  Level of consciousness: awake  Pain score: 1  Pain management: adequate  Airway patency: patent  Anesthetic complications: No anesthetic complications    Cardiovascular status: acceptable  Respiratory status: acceptable  Hydration status: acceptable

## 2017-02-02 ENCOUNTER — HOSPITAL ENCOUNTER (OUTPATIENT)
Dept: PHYSICAL THERAPY | Facility: HOSPITAL | Age: 52
Setting detail: THERAPIES SERIES
Discharge: HOME | End: 2017-02-02
Attending: ORTHOPAEDIC SURGERY

## 2017-02-02 ENCOUNTER — TRANSCRIBE ORDERS (OUTPATIENT)
Dept: PHYSICAL THERAPY | Facility: HOSPITAL | Age: 52
End: 2017-02-02

## 2017-02-02 DIAGNOSIS — M25.562 ACUTE PAIN OF LEFT KNEE: Primary | ICD-10-CM

## 2017-02-02 DIAGNOSIS — R26.9 ABNORMAL GAIT: ICD-10-CM

## 2017-02-02 DIAGNOSIS — Z98.890 S/P LEFT KNEE ARTHROSCOPY: ICD-10-CM

## 2017-02-02 DIAGNOSIS — Z98.890 S/P KNEE SURGERY: Primary | ICD-10-CM

## 2017-02-02 PROCEDURE — 97161 PT EVAL LOW COMPLEX 20 MIN: CPT | Performed by: PHYSICAL THERAPIST

## 2017-02-02 PROCEDURE — 97110 THERAPEUTIC EXERCISES: CPT | Performed by: PHYSICAL THERAPIST

## 2017-02-02 NOTE — PROGRESS NOTES
Outpatient Physical Therapy Ortho Initial Evaluation  [facility]     Patient Name: Alem Arzate  : 1965  MRN: [id number]  Today's Date: 2017      Visit Date: 2017    There is no problem list on file for this patient.       Past Medical History   Diagnosis Date   • Acute meniscal tear of left knee    • Arthritis         Past Surgical History   Procedure Laterality Date   • Bladder repair     • Knee meniscal repair Right    • Tooth extraction     • Knee arthroscopy Left 2017     Procedure: LEFT KNEE ARTHROSCOPY WITH CHONDROPLASTY  AND  DRILLING;  Surgeon: Colt Fournier MD;  Location:  [facility];  Service:        Visit Dx:     ICD-10-CM ICD-9-CM   1. Acute pain of left knee M25.562 719.46   2. S/P left knee arthroscopy Z98.890 V45.89   3. Abnormal gait R26.9 781.2             Patient History       17 1500          History    Chief Complaint Difficulty Walking;Difficulty with daily activities;Joint stiffness;Joint swelling;Muscle weakness;Pain  -LH      Type of Pain Knee pain  -LH      Date Current Problem(s) Began 17  -      Brief Description of Current Complaint Pt was referreeing a basketball game and she turned on her L LE and she had a pop. She had a small meniscus tear. She got an injection from one MD and she continued with swelling. She went to another MD and they did the scope. She is a week post op and is walking without an Ad. She is taking meds at night for pain. He drained her knee today at her follow up appt  -      Previous treatment for THIS PROBLEM Injections  -      Onset Date- PT 17  -      Patient/Caregiver Goals Relieve pain;Return to prior level of function;Improve mobility;Improve strength  -      Occupation/sports/leisure activities [employer]  -LH      How has patient tried to help current problem? meds, ice  -LH      Surgery Date 1 17  -      Pain     Pain Location Knee  -      Pain at Present 4  -LH      Pain at Best 2   -      Pain at Worst 8  -      Pain Frequency Constant/continuous  -      Pain Description Aching;Stabbing  -      What Performance Factors Make the Current Problem(s) WORSE? walking, standing, bending  -      What Performance Factors Make the Current Problem(s) BETTER? ice, meds  -      Fall Risk Assessment    Any falls in the past year: No  -      Services    Prior Rehab/Home Health Experiences Yes  -      Daily Activities    Primary Language English  -      Teaching needs identified Home Exercise Program;Management of Condition  -      Patient is concerned about/has problems with Climbing Stairs;Performing home management (household chores, shopping, care of dependents);Performing job responsibilities/community activities (work, school,;Performing sports, recreation, and play activities;Standing;Walking  -      Pt Participated in POC and Goals Yes  -      Safety    Are you being hurt, hit, or frightened by anyone at home or in your life? No  -LH      Are you being neglected by a caregiver No  -        User Key  (r) = Recorded By, (t) = Taken By, (c) = Cosigned By    Initials Name Provider Type     Kierra Chavez, PT Physical Therapist                PT Ortho       02/02/17 1500    Posture/Observations    Observations Edema;Erythema;Incision healing  -    Sensation    Sensation WNL? WNL  -LH    Knee Palpation    Medial Joint Line Left:;Tender;Swollen  -    Medial Condyle Left:;Tender;Swollen  -    Left Knee    Extension/Flexion ROM Details ext=15 deg, flex=80 deg  -LH    Right Knee    Extension/Flexion ROM Details ext=0 deg, flex=125 deg  -    Left Hip    Hip Flexion Gross Movement (3+/5) fair plus  -LH    Hip ABduction Gross Movement (3+/5) fair plus  -    Right Hip    Hip Flexion Gross Movement (4+/5) good plus  -LH    Hip ABduction Gross Movement (4+/5) good plus  -LH    Left Knee    Knee Extension Gross Movement (3+/5) fair plus  -LH    Knee Flexion Gross Movement  (3+/5) fair plus  -LH    Right Knee    Knee Extension Gross Movement (5/5) normal  -LH    Knee Flexion Gross Movement (5/5) normal  -    Lower Extremity Flexibility    Hamstrings Left:;Mildly limited;Moderately limited  -LH    Gastrocnemius Left:;Mildly limited  -LH    Soleus Mildly limited  -LH    RLE Quick Girth (cm)    Tib tuberosity 35 cm  -LH    Mid patella 39 cm  -LH    Distal thigh 43 cm  -LH    LLE Quick Girth (cm)    Tib tuberosity 36.5 cm  -LH    Mid patella 42 cm  -LH    Distal thigh 46.5 cm  -LH    Gait Assessment/Treatment    Gait, Comment moderate antalgia without AD, decreased stance on L LE, dec TKE, dec heel.toe pattern  -      User Key  (r) = Recorded By, (t) = Taken By, (c) = Cosigned By    Initials Name Provider Type     Kierra Chavez, PT Physical Therapist                            Therapy Education       02/02/17 1624    Therapy Education    Given --   suggested pt use single crutch for gait  -      02/02/17 1610    Therapy Education    Given HEP;Symptoms/condition management;Edema management;Mobility training;Pain management  -    Program New  [facility]    How Provided Verbal;Demonstration;Written  -    Provided to Patient  -    Level of Understanding Teach back education performed;Verbalized;Demonstrated  -      User Key  (r) = Recorded By, (t) = Taken By, (c) = Cosigned By    Initials Name Provider Type     Kierra Chavez, PT Physical Therapist                PT OP Goals       02/02/17 1600          PT Short Term Goals    STG 1 Patient will be independent with education for symptom management, joint protection and strategies to minimize stress on affected tissues  -      STG 1 Progress New  [facility]      STG 2 Patient will report decreased pain rating on VAS from  8/10 to  4/10 with ADLs and household activities.  -      STG 2 Progress [facility]      STG 3 Pt to improve knee ROM to ext=8 deg and flex=105 deg for improved gait pattern  -      STG 3 Progress [facility]  4 Patient will ambulate without assistive device community distances with near normal gait  -      STG 4 Progress New  -      Long Term Goals    LTG 1 Pt to improve knee ROM to ext=0 deg and flex=120 deg for improved gait pattern  -      LTG 1 Progress New  -      LTG 2 Patient will increase knee strength to 4+/5 to improve functional mobility  -      LTG 2 Progress New  -      LTG 3 Pt to improve score on Knee Outcome Survey by 40% for overall functional improvement  -      LTG 3 Progress New  -      LTG 4 Patient will negotiate stairs reciprocally with rail support as needed  -      LTG 4 Progress New  -      LTG 5 Patient will demonstrate an independent HEP for core and knee strength and flexibility/ROM.  -      LTG 5 Progress New  [facility]      Time Calculation    PT Goal Re-Cert Due Date 03/02/17  -        User Key  (r) = Recorded By, (t) = Taken By, (c) = Cosigned By    Initials Name Provider Type     Kierra Chavez, PT Physical Therapist                PT Assessment/Plan       02/02/17 0965          PT Assessment    Functional Limitations Impaired gait;Impaired locomotion;Limitation in home management;Limitations in community activities;Performance in work activities;Performance in sport activities;Performance in leisure activities;Limitations in functional capacity and performance  -      Impairments Locomotion;Range of motion;Muscle strength;Pain;Edema;Impaired flexibility;Gait  -      Assessment Comments Pt presents to therapy s/p L knee scope on 1/25/17. She injured her knee in Nov while referreeing a backetball game. She is ambulating with a moderate antalgia, decreased stance time, and decreased TKE on the L LE. She has decreased ROM in ext=15 deg and ext=80 deg, decreased hip and knee strength, and decreased flexiblity of the hamstrings and gastroc/soleus complex. She has edema of the knee (had it drained earlier today) and tenderness over the medial joiint line. Pt scored a  40% on the KOS, with 100% being no disability. Pt would benef it from therapy to address these issues to help her return to her active lifestyle.   -      Please refer to paper survey for additional self-reported information Yes  -LH      Rehab Potential Good  -      Patient/caregiver participated in establishment of treatment plan and goals Yes  -      Patient would benefit from skilled therapy intervention Yes  -LH      PT Plan    PT Frequency 2x/week  -      Predicted Duration of Therapy Intervention (days/wks) 6 weeks  -      Planned CPT's? PT EVAL: 84286;PT THER PROC EA 15 MIN: 37831;PT GAIT TRAINING EA 15 MIN: 78162;PT MANUAL THERAPY EA 15 MIN: 07507;PT HOT/COLD PACK WC NONMCARE: 17698  -      Physical Therapy Interventions (Optional Details) ROM (Range of Motion);balance training;stair training;manual therapy techniques;strengthening;modalities;stretching;home exercise program;postural re-education;patient/family education;gait training  -      PT Plan Comments plan to see pt 2x week for ROM, strength, and gait to improve function  -        User Key  (r) = Recorded By, (t) = Taken By, (c) = Cosigned By    Initials Name Provider Type     Kierra Chavez, SHERRON Physical Therapist                  Exercises       02/02/17 1600          Exercise 1    Exercise Name 1 see chart for exercises performed  -        User Key  (r) = Recorded By, (t) = Taken By, (c) = Cosigned By    Initials Name Provider Type     Kierra Chavez PT Physical Therapist                              Outcome Measures       02/02/17 1600          Knee Outcome Score    Knee Outcome Score Comments 40%  -      Functional Assessment    Outcome Measure Options Knee Outcome Score- ADL  -        User Key  (r) = Recorded By, (t) = Taken By, (c) = Cosigned By    Initials Name Provider Type     Kierra Chavez PT Physical Therapist            Time Calculation:   Start Time: 1500  Stop Time: 1545  Time Calculation (min): 45  min     Therapy Charges for Today     Code Description Service Date Service Provider Modifiers Qty    02593607000 HC PT EVAL LOW COMPLEXITY 2 2/2/2017 Kierra Chavez, PT GP 1    72557057171 HC PT THER PROC EA 15 MIN 2/2/2017 Kierra Chavez, PT GP 1          PT G-Codes  Outcome Measure Options: Knee Outcome Score- ADL         Kierra Chavez, PT  2/2/2017

## 2017-02-08 ENCOUNTER — HOSPITAL ENCOUNTER (OUTPATIENT)
Dept: PHYSICAL THERAPY | Facility: HOSPITAL | Age: 52
Setting detail: THERAPIES SERIES
Discharge: HOME | End: 2017-02-08

## 2017-02-08 DIAGNOSIS — R26.9 ABNORMAL GAIT: ICD-10-CM

## 2017-02-08 DIAGNOSIS — M25.562 ACUTE PAIN OF LEFT KNEE: Primary | ICD-10-CM

## 2017-02-08 DIAGNOSIS — Z98.890 S/P LEFT KNEE ARTHROSCOPY: ICD-10-CM

## 2017-02-08 PROCEDURE — 97110 THERAPEUTIC EXERCISES: CPT

## 2017-02-08 PROCEDURE — 97010 HOT OR COLD PACKS THERAPY: CPT

## 2017-02-08 NOTE — PROGRESS NOTES
Outpatient Physical Therapy Ortho Treatment Note  [facility]     Patient Name: Alem Arzate  : 1965  MRN: [id number]  Today's Date: 2017      Visit Date: 2017    Visit Dx:    ICD-10-CM ICD-9-CM   1. Acute pain of left knee M25.562 719.46   2. S/P left knee arthroscopy Z98.890 V45.89   3. Abnormal gait R26.9 781.2       There is no problem list on file for this patient.       Past Medical History   Diagnosis Date   • Acute meniscal tear of left knee    • Arthritis         Past Surgical History   Procedure Laterality Date   • Bladder repair     • Knee meniscal repair Right    • Tooth extraction     • Knee arthroscopy Left 2017     Procedure: LEFT KNEE ARTHROSCOPY WITH CHONDROPLASTY  AND  DRILLING;  Surgeon: Colt Fournier MD;  Location: [facility] OR [facility];  Service:              PT Ortho       17 0700    Subjective Comments    Subjective Comments pt reports pain with heel slide ex  -SI    Subjective Pain    Able to rate subjective pain? yes  -SI    Pre-Treatment Pain Level 6  -SI    Post-Treatment Pain Level 6  -SI    Subjective Pain Comment 6 at worst lateral side of left knee  -SI    Posture/Observations    Observations Erythema;Edema  -SI    Left Knee    Extension/Flexion ROM Details -8 to 109  -SI    Gait Assessment/Treatment    Gait, Comment antalgic flexed knee gait  -SI      User Key  (r) = Recorded By, (t) = Taken By, (c) = Cosigned By    Initials Name Provider Type    DARIAN Luke PTA Physical Therapy Assistant                            PT Assessment/Plan       17 0801 17 1615       PT Assessment    Functional Limitations  Impaired gait;Impaired locomotion;Limitation in home management;Limitations in community activities;Performance in work activities;Performance in sport activities;Performance in leisure activities;Limitations in functional capacity and performance  -LH     Impairments  Locomotion;Range of motion;Muscle strength;Pain;Edema;Impaired  "flexibility;Gait  -     Assessment Comments getting a lot of \"catches\" in knee with ROM  -SI Pt presents to therapy s/p L knee scope on 1/25/17. She injured her knee in Nov while referreeing a backetball game. She is ambulating with a moderate antalgia, decreased stance time, and decreased TKE on the L LE. She has decreased ROM in ext=15 deg and ext=80 deg, decreased hip and knee strength, and decreased flexiblity of the hamstrings and gastroc/soleus complex. She has edema of the knee (had it drained earlier today) and tenderness over the medial joiint line. Pt scored a 40% on the KOS, with 100% being no disability. Pt would benef it from therapy to address these issues to help her return to her active lifestyle.   -     Please refer to paper survey for additional self-reported information  Yes  -     Rehab Potential  Good  -     Patient/caregiver participated in establishment of treatment plan and goals  Yes  -     Patient would benefit from skilled therapy intervention  Yes  -     PT Plan    PT Frequency  2x/week  -     Predicted Duration of Therapy Intervention (days/wks)  6 weeks  -     Planned CPT's?  PT EVAL: 54376;PT THER PROC EA 15 MIN: 27743;PT GAIT TRAINING EA 15 MIN: 40107;PT MANUAL THERAPY EA 15 MIN: 81672;PT HOT/COLD PACK WC NONMCARE: 28489  -     Physical Therapy Interventions (Optional Details)  ROM (Range of Motion);balance training;stair training;manual therapy techniques;strengthening;modalities;stretching;home exercise program;postural re-education;patient/family education;gait training  -     PT Plan Comments  plan to see pt 2x week for ROM, strength, and gait to improve function  -       User Key  (r) = Recorded By, (t) = Taken By, (c) = Cosigned By    Initials Name Provider Type     Kierra Chavez, PT Physical Therapist    DARIAN Luke, PTA Physical Therapy Assistant                Modalities       02/08/17 0700          Ice    Ice Applied Yes  -SI      Location " left knee  -SI      Rx Minutes 10 mins  -SI      Ice S/P Rx Yes  -SI        User Key  (r) = Recorded By, (t) = Taken By, (c) = Cosigned By    Initials Name Provider Type    DARIAN Luke PTA Physical Therapy Assistant                Exercises       02/08/17 0700          Subjective Comments    Subjective Comments pt reports pain with heel slide ex  -SI      Subjective Pain    Able to rate subjective pain? yes  -SI      Pre-Treatment Pain Level 6  -SI      Post-Treatment Pain Level 6  -SI      Subjective Pain Comment 6 at worst lateral side of left knee  -SI      Exercise 1    Exercise Name 1 see chart for exercises performed  -SI        User Key  (r) = Recorded By, (t) = Taken By, (c) = Cosigned By    Initials Name Provider Type    DARIAN Luke PTA Physical Therapy Assistant                        Manual Rx (last 36 hours)      Manual Treatments       02/08/17 0700          Manual Rx 1    Manual Rx 1 Location patella mobility checked and fairly equal to other knee but both a little tight  -SI        User Key  (r) = Recorded By, (t) = Taken By, (c) = Cosigned By    Initials Name Provider Type    DARIAN Luke PTA Physical Therapy Assistant                PT OP Goals       02/02/17 1600          PT Short Term Goals    STG 1 Patient will be independent with education for symptom management, joint protection and strategies to minimize stress on affected tissues  -      STG 1 Progress New  -      STG 2 Patient will report decreased pain rating on VAS from  8/10 to  4/10 with ADLs and household activities.  -      STG 2 Progress New  [facility]      STG 3 Pt to improve knee ROM to ext=8 deg and flex=105 deg for improved gait pattern  -      STG 3 Progress New  [facility]      STG 4 Patient will ambulate without assistive device community distances with near normal gait  -      STG 4 Progress [facility]      Long Term Goals    LTG 1 Pt to improve knee ROM to ext=0 deg and flex=120 deg for improved gait pattern   -      LTG 1 Progress New  -      LTG 2 Patient will increase knee strength to 4+/5 to improve functional mobility  -      LTG 2 Progress New  -      LTG 3 Pt to improve score on Knee Outcome Survey by 40% for overall functional improvement  -      LTG 3 Progress New  -      LTG 4 Patient will negotiate stairs reciprocally with rail support as needed  -      LTG 4 Progress New  -      LTG 5 Patient will demonstrate an independent HEP for core and knee strength and flexibility/ROM.  -      LTG 5 Progress New  -      Time Calculation    PT Goal Re-Cert Due Date 03/02/17  -        User Key  (r) = Recorded By, (t) = Taken By, (c) = Cosigned By    Initials Name Provider Type     Kierra Chavez PT Physical Therapist                Therapy Education       02/08/17 0800    Therapy Education    Given HEP  -    Program Progressed  -    How Provided Verbal;Demonstration;Written  -SI    Provided to Patient  -    Level of Understanding Teach back education performed  -      02/02/17 1624    Therapy Education    Given --   suggested pt use single crutch for gait  -      02/02/17 1610    Therapy Education    Given HEP;Symptoms/condition management;Edema management;Mobility training;Pain management  -    Program New  -    How Provided Verbal;Demonstration;Written  -    Provided to Patient  -    Level of Understanding Teach back education performed;Verbalized;Demonstrated  -      User Key  (r) = Recorded By, (t) = Taken By, (c) = Cosigned By    Initials Name Provider Type     Kierra Chavez PT Physical Therapist    SI Kelsey Luke PTA Physical Therapy Assistant                Time Calculation:   Start Time: 0715  Stop Time: 0810  Time Calculation (min): 55 min    Therapy Charges for Today     Code Description Service Date Service Provider Modifiers Qty    94563728498 HC PT THER PROC EA 15 MIN 2/8/2017 Kelsey Luke PTA GP 3    04036730976 HC PT HOT/COLD PACK WC NONMCARE  2/8/2017 Kelsey Luke, PTA GP 1                    Kelsey Luke, PTA  2/8/2017

## 2017-02-10 ENCOUNTER — HOSPITAL ENCOUNTER (OUTPATIENT)
Dept: PHYSICAL THERAPY | Facility: HOSPITAL | Age: 52
Setting detail: THERAPIES SERIES
Discharge: HOME | End: 2017-02-10

## 2017-02-10 DIAGNOSIS — R26.9 ABNORMAL GAIT: ICD-10-CM

## 2017-02-10 DIAGNOSIS — Z98.890 S/P LEFT KNEE ARTHROSCOPY: ICD-10-CM

## 2017-02-10 DIAGNOSIS — M25.562 ACUTE PAIN OF LEFT KNEE: Primary | ICD-10-CM

## 2017-02-10 PROCEDURE — 97110 THERAPEUTIC EXERCISES: CPT

## 2017-02-10 PROCEDURE — 97010 HOT OR COLD PACKS THERAPY: CPT

## 2017-02-10 NOTE — PROGRESS NOTES
Outpatient Physical Therapy Ortho Treatment Note  [facility]     Patient Name: Alem Arzate  : 1965  MRN: [id number]  Today's Date: 2/10/2017      Visit Date: 02/10/2017    Visit Dx:    ICD-10-CM ICD-9-CM   1. Acute pain of left knee M25.562 719.46   2. S/P left knee arthroscopy Z98.890 V45.89   3. Abnormal gait R26.9 781.2       There is no problem list on file for this patient.       Past Medical History   Diagnosis Date   • Acute meniscal tear of left knee    • Arthritis         Past Surgical History   Procedure Laterality Date   • Bladder repair     • Knee meniscal repair Right    • Tooth extraction     • Knee arthroscopy Left 2017     Procedure: LEFT KNEE ARTHROSCOPY WITH CHONDROPLASTY  AND  DRILLING;  Surgeon: Colt Fournier MD;  Location: [facility] OR [facility] [place];  Service:              PT Ortho       02/10/17 0700    Subjective Comments    Subjective Comments pain at night the worst after oob all day  -SI    Subjective Pain    Able to rate subjective pain? yes  -SI    Pre-Treatment Pain Level 6  -SI    Post-Treatment Pain Level 6  -SI    Subjective Pain Comment 6 is at worst  -SI    Left Knee    Extension/Flexion ROM Details -6 to 110  -SI    Left Knee    Knee Extension Gross Movement (4-/5) good minus  -SI    Knee Flexion Gross Movement (4+/5) good plus  -SI    LLE Quick Girth (cm)    Tib tuberosity 38.8 cm  -SI    Mid patella 40.5 cm  -SI    Distal thigh 42.5 cm   superior border left patella  -SI      17 0700    Subjective Comments    Subjective Comments pt reports pain with heel slide ex  -SI    Subjective Pain    Able to rate subjective pain? yes  -SI    Pre-Treatment Pain Level 6  -SI    Post-Treatment Pain Level 6  -SI    Subjective Pain Comment 6 at worst lateral side of left knee  -SI    Posture/Observations    Observations Erythema;Edema  -SI    Left Knee    Extension/Flexion ROM Details -8 to 109  -SI    Gait Assessment/Treatment    Gait, Comment antalgic flexed knee gait  -SI  "     User Key  (r) = Recorded By, (t) = Taken By, (c) = Cosigned By    Initials Name Provider Type    DARIAN CRUZ MAURO Luke Physical Therapy Assistant                            PT Assessment/Plan       02/10/17 0801 02/08/17 0801       PT Assessment    Assessment Comments Pt still with an infammed appearing knee.  Fading redness at lateral scope lite, and fading bruise posterior lateral knee  -SI getting a lot of \"catches\" in knee with ROM  -SI       User Key  (r) = Recorded By, (t) = Taken By, (c) = Cosigned By    Initials Name Provider Type    DARIAN CRUZ MAURO Luke Physical Therapy Assistant                Modalities       02/10/17 0700          Ice    Ice Applied Yes  -SI      Location left knee  -SI      Rx Minutes 10 mins  -SI      Ice S/P Rx Yes  -SI        User Key  (r) = Recorded By, (t) = Taken By, (c) = Cosigned By    Initials Name Provider Type    DARIAN CRUZ MAURO Luke Physical Therapy Assistant                Exercises       02/10/17 0700          Subjective Comments    Subjective Comments pain at night the worst after oob all day  -SI      Subjective Pain    Able to rate subjective pain? yes  -SI      Pre-Treatment Pain Level 6  -SI      Post-Treatment Pain Level 6  -SI      Subjective Pain Comment 6 is at worst  -SI      Exercise 1    Exercise Name 1 see chart for exercises performed  -SI        User Key  (r) = Recorded By, (t) = Taken By, (c) = Cosigned By    Initials Name Provider Type    DARIAN Cole NANCY Luke PTA Physical Therapy Assistant                               PT OP Goals       02/02/17 1600          PT Short Term Goals    STG 1 Patient will be independent with education for symptom management, joint protection and strategies to minimize stress on affected tissues  -      STG 1 Progress New  -      STG 2 Patient will report decreased pain rating on VAS from  8/10 to  4/10 with ADLs and household activities.  -      STG 2 Progress New  -      STG 3 Pt to improve knee ROM to ext=8 " deg and flex=105 deg for improved gait pattern  -      STG 3 Progress New  [facility]      STG 4 Patient will ambulate without assistive device community distances with near normal gait  -      STG 4 Progress New  [facility]      Long Term Goals    LTG 1 Pt to improve knee ROM to ext=0 deg and flex=120 deg for improved gait pattern  -      LTG 1 Progress New  [facility]      LTG 2 Patient will increase knee strength to 4+/5 to improve functional mobility  -      LTG 2 Progress New  [facility]      LTG 3 Pt to improve score on Knee Outcome Survey by 40% for overall functional improvement  -      LTG 3 Progress New  -      LTG 4 Patient will negotiate stairs reciprocally with rail support as needed  -      LTG 4 Progress New  [facility]      LTG 5 Patient will demonstrate an independent HEP for core and knee strength and flexibility/ROM.  -      LTG 5 Progress New  [facility]      Time Calculation    PT Goal Re-Cert Due Date 03/02/17  [facility]        User Key  (r) = Recorded By, (t) = Taken By, (c) = Cosigned By    Initials Name Provider Type     Kierra Chavez PT Physical Therapist                Therapy Education       02/10/17 0800    Therapy Education    Given HEP;Symptoms/condition management  -SI    Program Progressed  -SI    How Provided Verbal;Demonstration;Written  -SI    Provided to Patient  -SI    Level of Understanding Teach back education performed  -SI      02/08/17 0800    Therapy Education    Given HEP  -SI    Program Progressed  -SI    How Provided Verbal;Demonstration;Written  -SI    Provided to Patient  -SI    Level of Understanding Teach back education performed  -SI      User Key  (r) = Recorded By, (t) = Taken By, (c) = Cosigned By    Initials Name Provider Type    DARIAN Luke PTA Physical Therapy Assistant                Time Calculation:   Start Time: 0715  Stop Time: 0810  Time Calculation (min): 55 min    Therapy Charges for Today     Code Description Service Date Service Provider Modifiers Qty    41896365475  HC PT THER PROC EA 15 MIN 2/10/2017 Kelsey Luke PTA GP 3    [id number] HC PT HOT/COLD PACK WC NONMCARE 2/10/2017 Kelsey Luke PTA GP 1                    Kelsey Luke, MAURO  2/10/2017

## 2017-02-14 ENCOUNTER — HOSPITAL ENCOUNTER (OUTPATIENT)
Dept: PHYSICAL THERAPY | Facility: HOSPITAL | Age: 52
Setting detail: THERAPIES SERIES
Discharge: HOME | End: 2017-02-14

## 2017-02-14 DIAGNOSIS — Z98.890 S/P LEFT KNEE ARTHROSCOPY: ICD-10-CM

## 2017-02-14 DIAGNOSIS — R26.9 ABNORMAL GAIT: ICD-10-CM

## 2017-02-14 DIAGNOSIS — M25.562 ACUTE PAIN OF LEFT KNEE: Primary | ICD-10-CM

## 2017-02-14 PROCEDURE — 97110 THERAPEUTIC EXERCISES: CPT

## 2017-02-14 NOTE — PROGRESS NOTES
Outpatient Physical Therapy Ortho Treatment Note  [facility]     Patient Name: Alem Arzate  : 1965  MRN: [id number]  Today's Date: 2017      Visit Date: 2017    Visit Dx:    ICD-10-CM ICD-9-CM   1. Acute pain of left knee M25.562 719.46   2. S/P left knee arthroscopy Z98.890 V45.89   3. Abnormal gait R26.9 781.2       There is no problem list on file for this patient.       Past Medical History   Diagnosis Date   • Acute meniscal tear of left knee    • Arthritis         Past Surgical History   Procedure Laterality Date   • Bladder repair     • Knee meniscal repair Right    • Tooth extraction     • Knee arthroscopy Left 2017     Procedure: LEFT KNEE ARTHROSCOPY WITH CHONDROPLASTY  AND  DRILLING;  Surgeon: Colt Fournier MD;  [place] [facility] [place] [facility] [place]  Service:              PT Ortho       17 0800    Subjective Comments    Subjective Comments Lot of swelling yeaterday and pain.  Did not do an unususal amount of walking??  -SI    Subjective Pain    Able to rate subjective pain? yes  -SI    Pre-Treatment Pain Level 6  -SI    Post-Treatment Pain Level 6  -SI    Subjective Pain Comment 8 yesterday at worst 6 today  -SI    Special Tests/Palpation    Special Tests/Palpation Knee  -SI    Knee Palpation    Knee Palpation? --   tender distal quads and posterior medial knee  -SI    Left Knee    Extension/Flexion ROM Details -3 to 110/117  -SI    Left Knee    Knee Extension Gross Movement (4/5) good  -SI    Gait Assessment/Treatment    Gait, Comment antalgic and flexed knee  -SI    Stairs Assessment/Treatment    Stairs, Comment Recip to ascend a few but not to descend  -SI      User Key  (r) = Recorded By, (t) = Taken By, (c) = Cosigned By    Initials Name Provider Type    DARIAN Luke PTA Physical Therapy Assistant                            PT Assessment/Plan       17 0844 02/10/17 0817 08    PT Assessment    Assessment Comments Pt able to ascend a few stairs  "reciprocally and advised not to try reciprocally descending stairs due to pain, edema and only 3 weeks post-op.  Still having lot of intermittent swelling especially at end of day per her report..  Not so much at tx time early AM  -SI Pt still with an infammed appearing knee.  Fading redness at lateral scope lite, and fading bruise posterior lateral knee  -SI getting a lot of \"catches\" in knee with ROM  -SI      User Key  (r) = Recorded By, (t) = Taken By, (c) = Cosigned By    Initials Name Provider Type    DARIAN Luke PTA Physical Therapy Assistant                Modalities       02/14/17 0800          Ice    Ice Applied Yes  -SI      Location left knee  -SI      Rx Minutes Other:   5 minutes with heel prop for ext  -SI      Ice S/P Rx Yes  -SI        User Key  (r) = Recorded By, (t) = Taken By, (c) = Cosigned By    Initials Name Provider Type    DARIAN Luke PTA Physical Therapy Assistant                Exercises       02/14/17 0800          Subjective Comments    Subjective Comments Lot of swelling yeaterday and pain.  Did not do an unususal amount of walking??  -SI      Subjective Pain    Able to rate subjective pain? yes  -SI      Pre-Treatment Pain Level 6  -SI      Post-Treatment Pain Level 6  -SI      Subjective Pain Comment 8 yesterday at worst 6 today  -SI      Exercise 1    Exercise Name 1 see chart for exercises performed  -SI        User Key  (r) = Recorded By, (t) = Taken By, (c) = Cosigned By    Initials Name Provider Type    DARIAN Kelseyangel Luke PTA Physical Therapy Assistant                               PT OP Goals       02/14/17 0800 02/02/17 1600       PT Short Term Goals    STG 1 Patient will be independent with education for symptom management, joint protection and strategies to minimize stress on affected tissues  -SI Patient will be independent with education for symptom management, joint protection and strategies to minimize stress on affected tissues  -LH     STG 1 Progress Met  " -SI New  -     STG 2 Patient will report decreased pain rating on VAS from  8/10 to  4/10 with ADLs and household activities.  -SI Patient will report decreased pain rating on VAS from  8/10 to  4/10 with ADLs and household activities.  -     STG 2 Progress Ongoing  -SI New  -     STG 3 Pt to improve knee ROM to ext=8 deg and flex=105 deg for improved gait pattern  -SI Pt to improve knee ROM to ext=8 deg and flex=105 deg for improved gait pattern  -     STG 3 Progress Met  -SI New  -     STG 4 Patient will ambulate without assistive device community distances with near normal gait  -SI Patient will ambulate without assistive device community distances with near normal gait  -[facility] 4 Progress Partially Met   still with flex knee gait  -SI New  [facility]     Long Term Goals    LTG 1  Pt to improve knee ROM to ext=0 deg and flex=120 deg for improved gait pattern  -     LTG 1 Progress  New  -     LTG 2 Patient will increase knee strength to 4+/5 to improve functional mobility  -SI Patient will increase knee strength to 4+/5 to improve functional mobility  -     LTG 2 Progress Partially Met  -SI New  -     LTG 3  Pt to improve score on Knee Outcome Survey by 40% for overall functional improvement  -     LTG 3 Progress  New  [facility]     LTG 4  Patient will negotiate stairs reciprocally with rail support as needed  -     LTG 4 Progress  New  [facility]     LTG 5  Patient will demonstrate an independent HEP for core and knee strength and flexibility/ROM.  -     LT 5 Progress  New  [facility]     Time Calculation    PT Goal Re-Cert Due Date  03/02/17  -       User Key  (r) = Recorded By, (t) = Taken By, (c) = Cosigned By    Initials Name Provider Type     Kierra Chavez, PT Physical Therapist    DARIAN Luke, MAURO Physical Therapy Assistant                Therapy Education       02/14/17 0843    Therapy Education    Given HEP;Symptoms/condition management  -SI    Program Reinforced  -SI    How Provided  Verbal;Written  -SI    Provided to Patient  -SI    Level of Understanding Teach back education performed  -SI      02/10/17 0800    Therapy Education    Given HEP;Symptoms/condition management  -SI    Program Progressed  -SI    How Provided Verbal;Demonstration;Written  -SI    Provided to Patient  -SI    Level of Understanding Teach back education performed  -SI      02/08/17 0800    Therapy Education    Given HEP  -SI    Program Progressed  -SI    How Provided Verbal;Demonstration;Written  -SI    Provided to Patient  -SI    Level of Understanding Teach back education performed  -SI      User Key  (r) = Recorded By, (t) = Taken By, (c) = Cosigned By    Initials Name Provider Type    DARIAN Luke PTA Physical Therapy Assistant                Time Calculation:   Start Time: 0805  Stop Time: 0846  Time Calculation (min): 41 min    Therapy Charges for Today     Code Description Service Date Service Provider Modifiers Qty    [id number] HC PT THER PROC EA 15 MIN 2/14/2017 Kelsey Luke PTA GP 3                    Kelsey Luke PTA  2/14/2017

## 2017-02-16 ENCOUNTER — HOSPITAL ENCOUNTER (OUTPATIENT)
Dept: PHYSICAL THERAPY | Facility: HOSPITAL | Age: 52
Setting detail: THERAPIES SERIES
Discharge: HOME | End: 2017-02-16

## 2017-02-16 DIAGNOSIS — M25.562 ACUTE PAIN OF LEFT KNEE: Primary | ICD-10-CM

## 2017-02-16 DIAGNOSIS — Z98.890 S/P LEFT KNEE ARTHROSCOPY: ICD-10-CM

## 2017-02-16 DIAGNOSIS — R26.9 ABNORMAL GAIT: ICD-10-CM

## 2017-02-16 PROCEDURE — 97110 THERAPEUTIC EXERCISES: CPT

## 2017-02-16 PROCEDURE — 97010 HOT OR COLD PACKS THERAPY: CPT

## 2017-02-16 NOTE — PROGRESS NOTES
Outpatient Physical Therapy Ortho Treatment Note  [facility]     Patient Name: Alem Arzate  : 1965  MRN: [id number]  Today's Date: 2017      Visit Date: 2017    Visit Dx:    ICD-10-CM ICD-9-CM   1. Acute pain of left knee M25.562 719.46   2. S/P left knee arthroscopy Z98.890 V45.89   3. Abnormal gait R26.9 781.2       There is no problem list on file for this patient.       Past Medical History   Diagnosis Date   • Acute meniscal tear of left knee    • Arthritis         Past Surgical History   Procedure Laterality Date   • Bladder repair     • Knee meniscal repair Right    • Tooth extraction     • Knee arthroscopy Left 2017     Procedure: LEFT KNEE ARTHROSCOPY WITH CHONDROPLASTY  AND  DRILLING;  Surgeon: Colt Fournier MD;  Location: [facility] OR [facility] [place];  Service:              PT Ortho       17 0900    Subjective Comments    Subjective Comments Knee feels pretty good so far today.  RTMD at PT today  -SI    Subjective Pain    Able to rate subjective pain? yes  -SI    Pre-Treatment Pain Level 4  -SI    Post-Treatment Pain Level 4  -SI    Left Knee    Extension/Flexion ROM Details -3 to 110/120  -SI    Left Knee    Knee Extension Gross Movement (4+/5) good plus  -SI    Knee Flexion Gross Movement (4+/5) good plus  -SI    LLE Quick Girth (cm)    Tib tuberosity 36.8 cm  -SI    Mid patella 41 cm  -SI    Distal thigh 42.5 cm  -SI    Gait Assessment/Treatment    Gait, Comment mild flex knee gait  -SI    Stairs Assessment/Treatment    Stairs, Comment Reciprocal to ascend and advised not to try yet to descend reciprocally   -SI      17 0800    Subjective Comments    Subjective Comments Lot of swelling yeaterday and pain.  Did not do an unususal amount of walking??  -SI    Subjective Pain    Able to rate subjective pain? yes  -SI    Pre-Treatment Pain Level 6  -SI    Post-Treatment Pain Level 6  -SI    Subjective Pain Comment 8 yesterday at worst 6 today  -SI    Special  "Tests/Palpation    Special Tests/Palpation Knee  -SI    Knee Palpation    Knee Palpation? --   tender distal quads and posterior medial knee  -SI    Left Knee    Extension/Flexion ROM Details -3 to 110/117  -SI    Left Knee    Knee Extension Gross Movement (4/5) good  -SI    Gait Assessment/Treatment    Gait, Comment antalgic and flexed knee  -SI    Stairs Assessment/Treatment    Stairs, Comment Recip to ascend a few but not to descend  -SI      User Key  (r) = Recorded By, (t) = Taken By, (c) = Cosigned By    Initials Name Provider Type    DARIAN Luke PTA Physical Therapy Assistant                            PT Assessment/Plan       02/16/17 0934 02/14/17 0844 02/10/17 0801    PT Assessment    Assessment Comments Pt RTMD today.  See \"Letter\" to MD  -SI Pt able to ascend a few stairs reciprocally and advised not to try reciprocally descending stairs due to pain, edema and only 3 weeks post-op.  Still having lot of intermittent swelling especially at end of day per her report..  Not so much at tx time early AM  -SI Pt still with an infammed appearing knee.  Fading redness at lateral scope lite, and fading bruise posterior lateral knee  -SI      User Key  (r) = Recorded By, (t) = Taken By, (c) = Cosigned By    Initials Name Provider Type    DARIAN Luke PTA Physical Therapy Assistant                    Exercises       02/16/17 0900          Subjective Comments    Subjective Comments Knee feels pretty good so far today.  RTMD at PT today  -SI      Subjective Pain    Able to rate subjective pain? yes  -SI      Pre-Treatment Pain Level 4  -SI      Post-Treatment Pain Level 4  -SI      Exercise 1    Exercise Name 1 see chart for exercises performed  -SI        User Key  (r) = Recorded By, (t) = Taken By, (c) = Cosigned By    Initials Name Provider Type    DARIAN Luke PTA Physical Therapy Assistant                               PT OP Goals       02/16/17 0900 02/14/17 0800       PT Short Term Goals    " STG 1  Patient will be independent with education for symptom management, joint protection and strategies to minimize stress on affected tissues  -SI     STG 1 Progress  Met  -SI     STG 2  Patient will report decreased pain rating on VAS from  8/10 to  4/10 with ADLs and household activities.  -SI     STG 2 Progress  Ongoing  -SI     STG 3  Pt to improve knee ROM to ext=8 deg and flex=105 deg for improved gait pattern  -SI     STG 3 Progress  Met  -SI     STG 4  Patient will ambulate without assistive device community distances with near normal gait  -SI     STG 4 Progress  Partially Met   still with flex knee gait  -SI     Long Term Goals    LTG 1 Pt to improve knee ROM to ext=0 deg and flex=120 deg for improved gait pattern  -SI      LTG 1 Progress Partially Met   met for PROM not active at this time  -SI      LTG 2 Patient will increase knee strength to 4+/5 to improve functional mobility  -SI Patient will increase knee strength to 4+/5 to improve functional mobility  -SI     LTG 2 Progress Met  -SI Partially Met  -SI     LTG 4 Patient will negotiate stairs reciprocally with rail support as needed  -SI      LTG 4 Progress Partially Met  -SI      LTG 5 Patient will demonstrate an independent HEP for core and knee strength and flexibility/ROM.  -SI      LTG 5 Progress Ongoing  -SI        User Key  (r) = Recorded By, (t) = Taken By, (c) = Cosigned By    Initials Name Provider Type    DARIAN Luke PTA Physical Therapy Assistant                Therapy Education       02/16/17 0933    Therapy Education    Given HEP  -SI    Program Reinforced  -SI    How Provided Verbal;Demonstration;Written  -SI    Provided to Patient  -SI    Level of Understanding Teach back education performed  -SI      02/14/17 0843    Therapy Education    Given HEP;Symptoms/condition management  -SI    Program Reinforced  -SI    How Provided Verbal;Written  -SI    Provided to Patient  -SI    Level of Understanding Teach back education  performed  -SI      02/10/17 0800    Therapy Education    Given HEP;Symptoms/condition management  -SI    Program Progressed  -SI    How Provided Verbal;Demonstration;Written  -SI    Provided to Patient  -SI    Level of Understanding Teach back education performed  -SI      User Key  (r) = Recorded By, (t) = Taken By, (c) = Cosigned By    Initials Name Provider Type    DARIAN Luke PTA Physical Therapy Assistant                Time Calculation:   Start Time: 0830  Stop Time: 0920  Time Calculation (min): 50 min    Therapy Charges for Today     Code Description Service Date Service Provider Modifiers Qty    79285848692 HC PT THER PROC EA 15 MIN 2/16/2017 Kelsey Luke PTA GP 3    [id number] HC PT HOT/COLD PACK WC NONMCARE 2/16/2017 Kelsey Luke PTA GP 1                    Kelsey Luke PTA  2/16/2017

## 2017-02-21 ENCOUNTER — HOSPITAL ENCOUNTER (OUTPATIENT)
Dept: PHYSICAL THERAPY | Facility: HOSPITAL | Age: 52
Setting detail: THERAPIES SERIES
Discharge: HOME | End: 2017-02-21

## 2017-02-21 DIAGNOSIS — R26.9 ABNORMAL GAIT: ICD-10-CM

## 2017-02-21 DIAGNOSIS — M25.562 ACUTE PAIN OF LEFT KNEE: Primary | ICD-10-CM

## 2017-02-21 DIAGNOSIS — Z98.890 S/P LEFT KNEE ARTHROSCOPY: ICD-10-CM

## 2017-02-21 PROCEDURE — 97110 THERAPEUTIC EXERCISES: CPT

## 2017-02-21 NOTE — PROGRESS NOTES
"    Outpatient Physical Therapy Ortho Treatment Note  [facility]     Patient Name: Alem Arzate  : 1965  MRN: [id number]  Today's Date: 2017      Visit Date: 2017    Visit Dx:    ICD-10-CM ICD-9-CM   1. Acute pain of left knee M25.562 719.46   2. S/P left knee arthroscopy Z98.890 V45.89   3. Abnormal gait R26.9 781.2       There is no problem list on file for this patient.       Past Medical History   Diagnosis Date   • Acute meniscal tear of left knee    • Arthritis         Past Surgical History   Procedure Laterality Date   • Bladder repair     • Knee meniscal repair Right    • Tooth extraction     • Knee arthroscopy Left 2017     Procedure: LEFT KNEE ARTHROSCOPY WITH CHONDROPLASTY  AND  DRILLING;  Surgeon: Colt Fournier MD;  Location: [facility] OR [facility] [place];  Service:              PT Ortho       17 1000    Subjective Comments    Subjective Comments MD drained some fluid and to continue PT and RTMD in 3 weeks.  -SI    Subjective Pain    Able to rate subjective pain? yes  -SI    Pre-Treatment Pain Level 4  -SI    Post-Treatment Pain Level 4  -SI    Subjective Pain Comment Not catching near as much  -SI    Left Knee    Extension/Flexion ROM Details -3 to 112/120  -SI    Gait Assessment/Treatment    Gait, Comment mild flex knee  -SI      User Key  (r) = Recorded By, (t) = Taken By, (c) = Cosigned By    Initials Name Provider Type    DARIAN Luke PTA Physical Therapy Assistant                            PT Assessment/Plan       17 1018 17 0934       PT Assessment    Assessment Comments Fluid drained from knee and swelling looks better.  Did not observe any \"catches\" in knee today with ex.  -SI Pt RTMD today.  See \"Letter\" to MD  -SI       User Key  (r) = Recorded By, (t) = Taken By, (c) = Cosigned By    Initials Name Provider Type    DARIAN Luke PTA Physical Therapy Assistant                Modalities       17 1000          Ice    Ice Applied Yes  -SI      " Location left knee  -SI      Rx Minutes 10 mins  -SI      Ice S/P Rx Yes  -SI        User Key  (r) = Recorded By, (t) = Taken By, (c) = Cosigned By    Initials Name Provider Type    DARIAN Luke PTA Physical Therapy Assistant                Exercises       02/21/17 1000          Subjective Comments    Subjective Comments MD drained some fluid and to continue PT and RTMD in 3 weeks.  -SI      Subjective Pain    Able to rate subjective pain? yes  -SI      Pre-Treatment Pain Level 4  -SI      Post-Treatment Pain Level 4  -SI      Subjective Pain Comment Not catching near as much  -SI      Exercise 1    Exercise Name 1 see chart for exercises performed  -SI        User Key  (r) = Recorded By, (t) = Taken By, (c) = Cosigned By    Initials Name Provider Type    DARIAN Luke PTA Physical Therapy Assistant                        Manual Rx (last 36 hours)      Manual Treatments       02/21/17 0900          Manual Rx 1    Manual Rx 1 Location Good patella mobility  -SI        User Key  (r) = Recorded By, (t) = Taken By, (c) = Cosigned By    Initials Name Provider Type    DARIAN Kelseyangel Luke PTA Physical Therapy Assistant                PT OP Goals       02/16/17 0900 02/14/17 0800       PT Short Term Goals    STG 1  Patient will be independent with education for symptom management, joint protection and strategies to minimize stress on affected tissues  -SI     STG 1 Progress  Met  -SI     STG 2  Patient will report decreased pain rating on VAS from  8/10 to  4/10 with ADLs and household activities.  -SI     STG 2 Progress  Ongoing  -SI     STG 3  Pt to improve knee ROM to ext=8 deg and flex=105 deg for improved gait pattern  -SI     STG 3 Progress  Met  -SI     STG 4  Patient will ambulate without assistive device community distances with near normal gait  -SI     STG 4 Progress  Partially Met   still with flex knee gait  -SI     Long Term Goals    LTG 1 Pt to improve knee ROM to ext=0 deg and flex=120 deg for  improved gait pattern  -SI      LTG 1 Progress Partially Met   met for PROM not active at this time  -SI      LTG 2 Patient will increase knee strength to 4+/5 to improve functional mobility  -SI Patient will increase knee strength to 4+/5 to improve functional mobility  -SI     LTG 2 Progress Met  -SI Partially Met  -SI     LTG 4 Patient will negotiate stairs reciprocally with rail support as needed  -SI      LTG 4 Progress Partially Met  -SI      LTG 5 Patient will demonstrate an independent HEP for core and knee strength and flexibility/ROM.  -SI      LTG 5 Progress Ongoing  -SI        User Key  (r) = Recorded By, (t) = Taken By, (c) = Cosigned By    Initials Name Provider Type    SI Kelsey Luke PTA Physical Therapy Assistant                Therapy Education       02/21/17 1018    Therapy Education    Given HEP;Symptoms/condition management  -SI    Program Reinforced  -SI    How Provided Verbal;Demonstration;Written  -SI    Provided to Patient  -SI    Level of Understanding Teach back education performed  -SI      02/16/17 0933    Therapy Education    Given HEP  -SI    Program Reinforced  -SI    How Provided Verbal;Demonstration;Written  -SI    Provided to Patient  -SI    Level of Understanding Teach back education performed  -SI      User Key  (r) = Recorded By, (t) = Taken By, (c) = Cosigned By    Initials Name Provider Type    SI Kelsey Luke PTA Physical Therapy Assistant                Time Calculation:   Start Time: 0930  Stop Time: 1019  Time Calculation (min): 49 min    Therapy Charges for Today     Code Description Service Date Service Provider Modifiers Qty    65037176813 HC PT THER PROC EA 15 MIN 2/21/2017 Kelsey Luke PTA GP 3                    Kelsey Luke PTA  2/21/2017

## 2017-02-23 ENCOUNTER — HOSPITAL ENCOUNTER (OUTPATIENT)
Dept: PHYSICAL THERAPY | Facility: HOSPITAL | Age: 52
Setting detail: THERAPIES SERIES
Discharge: HOME | End: 2017-02-23

## 2017-02-23 DIAGNOSIS — M25.562 ACUTE PAIN OF LEFT KNEE: Primary | ICD-10-CM

## 2017-02-23 DIAGNOSIS — Z98.890 S/P LEFT KNEE ARTHROSCOPY: ICD-10-CM

## 2017-02-23 DIAGNOSIS — R26.9 ABNORMAL GAIT: ICD-10-CM

## 2017-02-23 PROCEDURE — 97110 THERAPEUTIC EXERCISES: CPT

## 2017-02-23 PROCEDURE — 97010 HOT OR COLD PACKS THERAPY: CPT

## 2017-02-23 NOTE — PROGRESS NOTES
Outpatient Physical Therapy Ortho Treatment Note  [facility]     Patient Name: Alem Arzate  : 1965  MRN: [id number]  Today's Date: 2017      Visit Date: 2017    Visit Dx:    ICD-10-CM ICD-9-CM   1. Acute pain of left knee M25.562 719.46   2. S/P left knee arthroscopy Z98.890 V45.89   3. Abnormal gait R26.9 781.2       There is no problem list on file for this patient.       Past Medical History   Diagnosis Date   • Acute meniscal tear of left knee    • Arthritis         Past Surgical History   Procedure Laterality Date   • Bladder repair     • Knee meniscal repair Right    • Tooth extraction     • Knee arthroscopy Left 2017     Procedure: LEFT KNEE ARTHROSCOPY WITH CHONDROPLASTY  AND  DRILLING;  Surgeon: Colt Fournier MD;  [place] [facility] [place] [facility] [place]  Service:              PT Ortho       17 1300    Subjective Comments    Subjective Comments pt states she did just a few household chores and knee pain and swelling.  Really frustrated with knee pain and swelling  -SI    Subjective Pain    Able to rate subjective pain? yes  -SI    Pre-Treatment Pain Level 6  -SI    Post-Treatment Pain Level 6  -SI    Subjective Pain Comment Pain 6 last night and today  -SI    Posture/Observations    Observations Edema;Erythema  -SI    Left Knee    Extension/Flexion ROM Details -3 to 113/122  -SI    Left Hip    Hip Flexion Gross Movement (4+/5) good plus  -SI    Hip ABduction Gross Movement (4+/5) good plus  -SI    Left Knee    Knee Extension Gross Movement (4+/5) good plus  -SI    Knee Flexion Gross Movement (4+/5) good plus  -SI    Gait Assessment/Treatment    Gait, Comment mild flex knee and antalgic  -SI      17 1000    Subjective Comments    Subjective Comments MD drained some fluid and to continue PT and RTMD in 3 weeks.  -SI    Subjective Pain    Able to rate subjective pain? yes  -SI    Pre-Treatment Pain Level 4  -SI    Post-Treatment Pain Level 4  -SI    Subjective Pain Comment Not  "catching near as much  -SI    Left Knee    Extension/Flexion ROM Details -3 to 112/120  -SI    Gait Assessment/Treatment    Gait, Comment mild flex knee  -SI      User Key  (r) = Recorded By, (t) = Taken By, (c) = Cosigned By    Initials Name Provider Type    DARIAN Luke PTA Physical Therapy Assistant                            PT Assessment/Plan       02/23/17 1316 02/21/17 1018       PT Assessment    Assessment Comments Pt has good strength.  Knee flexion improving.  Minimal sharp catches now.  Pain in knee and edema persists at 4 1/2 weeks post-op.  -SI Fluid drained from knee and swelling looks better.  Did not observe any \"catches\" in knee today with ex.  -SI       User Key  (r) = Recorded By, (t) = Taken By, (c) = Cosigned By    Initials Name Provider Type    DARIAN Luke PTA Physical Therapy Assistant                Modalities       02/23/17 1300          Ice    Ice Applied Yes  -SI      Location left knee  -SI      Rx Minutes 15 mins  -SI      Ice S/P Rx Yes  -SI        User Key  (r) = Recorded By, (t) = Taken By, (c) = Cosigned By    Initials Name Provider Type    DARIAN Luke PTA Physical Therapy Assistant                Exercises       02/23/17 1300          Subjective Comments    Subjective Comments pt states she did just a few household chores and knee pain and swelling.  Really frustrated with knee pain and swelling  -SI      Subjective Pain    Able to rate subjective pain? yes  -SI      Pre-Treatment Pain Level 6  -SI      Post-Treatment Pain Level 6  -SI      Subjective Pain Comment Pain 6 last night and today  -SI      Exercise 1    Exercise Name 1 see chart for exercises performed  -SI        User Key  (r) = Recorded By, (t) = Taken By, (c) = Cosigned By    Initials Name Provider Type    DARIAN Luke PTA Physical Therapy Assistant                               PT OP Goals       02/16/17 0900 02/14/17 0800       PT Short Term Goals    STG 1  Patient will be independent with " education for symptom management, joint protection and strategies to minimize stress on affected tissues  -SI     STG 1 Progress  Met  -SI     STG 2  Patient will report decreased pain rating on VAS from  8/10 to  4/10 with ADLs and household activities.  -SI     STG 2 Progress  Ongoing  -SI     STG 3  Pt to improve knee ROM to ext=8 deg and flex=105 deg for improved gait pattern  -SI     STG 3 Progress  Met  -SI     STG 4  Patient will ambulate without assistive device community distances with near normal gait  -SI     STG 4 Progress  Partially Met   still with flex knee gait  -SI     Long Term Goals    LTG 1 Pt to improve knee ROM to ext=0 deg and flex=120 deg for improved gait pattern  -SI      LTG 1 Progress Partially Met   met for PROM not active at this time  -SI      LTG 2 Patient will increase knee strength to 4+/5 to improve functional mobility  -SI Patient will increase knee strength to 4+/5 to improve functional mobility  -SI     LTG 2 Progress Met  -SI Partially Met  -SI     LTG 4 Patient will negotiate stairs reciprocally with rail support as needed  -SI      LTG 4 Progress Partially Met  -SI      LTG 5 Patient will demonstrate an independent HEP for core and knee strength and flexibility/ROM.  -SI      LTG 5 Progress Ongoing  -SI        User Key  (r) = Recorded By, (t) = Taken By, (c) = Cosigned By    Initials Name Provider Type    DARIAN Luke PTA Physical Therapy Assistant                Therapy Education       02/23/17 1316    Therapy Education    Given HEP;Symptoms/condition management  -SI    Program Reinforced  -SI    How Provided Verbal;Demonstration;Written  -SI    Provided to Patient  -SI    Level of Understanding Teach back education performed  -SI      02/21/17 1018    Therapy Education    Given HEP;Symptoms/condition management  -SI    Program Reinforced  -SI    How Provided Verbal;Demonstration;Written  -SI    Provided to Patient  -SI    Level of Understanding Teach back education  performed  -SI      User Key  (r) = Recorded By, (t) = Taken By, (c) = Cosigned By    Initials Name Provider Type    SI Kelsey Luke PTA Physical Therapy Assistant                Time Calculation:   Start Time: 1100  Stop Time: 1200  Time Calculation (min): 60 min    Therapy Charges for Today     Code Description Service Date Service Provider Modifiers Qty    30881767705 HC PT THER PROC EA 15 MIN 2/23/2017 Kelsey Lkue PTA GP 3    [id number]  PT HOT/COLD PACK WC NONMCARE 2/23/2017 Kelsey Luke PTA GP 1                    Kelsey Luke PTA  2/23/2017

## 2017-02-28 ENCOUNTER — HOSPITAL ENCOUNTER (OUTPATIENT)
Dept: PHYSICAL THERAPY | Facility: HOSPITAL | Age: 52
Setting detail: THERAPIES SERIES
Discharge: HOME | End: 2017-02-28

## 2017-02-28 DIAGNOSIS — M25.562 ACUTE PAIN OF LEFT KNEE: Primary | ICD-10-CM

## 2017-02-28 DIAGNOSIS — R26.9 ABNORMAL GAIT: ICD-10-CM

## 2017-02-28 DIAGNOSIS — Z98.890 S/P LEFT KNEE ARTHROSCOPY: ICD-10-CM

## 2017-02-28 PROCEDURE — 97110 THERAPEUTIC EXERCISES: CPT

## 2017-02-28 PROCEDURE — 97140 MANUAL THERAPY 1/> REGIONS: CPT

## 2017-03-03 ENCOUNTER — HOSPITAL ENCOUNTER (OUTPATIENT)
Dept: PHYSICAL THERAPY | Facility: HOSPITAL | Age: 52
Setting detail: THERAPIES SERIES
Discharge: HOME | End: 2017-03-03

## 2017-03-03 DIAGNOSIS — M25.562 ACUTE PAIN OF LEFT KNEE: Primary | ICD-10-CM

## 2017-03-03 DIAGNOSIS — R26.9 ABNORMAL GAIT: ICD-10-CM

## 2017-03-03 DIAGNOSIS — Z98.890 S/P LEFT KNEE ARTHROSCOPY: ICD-10-CM

## 2017-03-03 PROCEDURE — 97010 HOT OR COLD PACKS THERAPY: CPT

## 2017-03-03 PROCEDURE — 97110 THERAPEUTIC EXERCISES: CPT

## 2017-03-03 NOTE — PROGRESS NOTES
"    Outpatient Physical Therapy Ortho Treatment Note  [facility]     Patient Name: Alem Arzate  : 1965  MRN: [id number]  Today's Date: 3/3/2017      Visit Date: 2017    Visit Dx:    ICD-10-CM ICD-9-CM   1. Acute pain of left knee M25.562 719.46   2. S/P left knee arthroscopy Z98.890 V45.89   3. Abnormal gait R26.9 781.2       There is no problem list on file for this patient.       Past Medical History   Diagnosis Date   • Acute meniscal tear of left knee    • Arthritis         Past Surgical History   Procedure Laterality Date   • Bladder repair     • Knee meniscal repair Right    • Tooth extraction     • Knee arthroscopy Left 2017     Procedure: LEFT KNEE ARTHROSCOPY WITH CHONDROPLASTY  AND  DRILLING;  Surgeon: Colt Fournier MD;  Location: [facility] OR 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    -SI    Subjective Pain    Able to rate subjective pain? yes  -SI    Pre-Treatment Pain Level 5  -SI    Post-Treatment Pain Level 5  -SI    Subjective Pain Comment 5 today walking  -SI    Posture/Observations    Observations Edema   mild now with very little warmth and no redness  -SI    Left Knee    Extension/Flexion ROM Details -2 to 120/127  -SI    Balance Skills Training    SLS 20 seconds with ease straight knee, and able to SL stance with flex knee 15 to 20 seconds without acute pain  -SI    Gait Assessment/Treatment    Gait, Comment Very good today despite pain.  No flex knee.  -SI      17 1600    Subjective Comments    Subjective Comments [place] [employer] [place] and lot of walking and did well this weekend.  -SI    Subjective Pain    Able to rate subjective pain? yes  -SI    Pre-Treatment Pain Level 2  -SI    Post-Treatment Pain Level 3  -SI    Posture/Observations    Observations " Edema  -SI    Left Knee    Extension/Flexion ROM Details -1or 2 to 113/127  -SI    Gait Assessment/Treatment    Gait, Comment Mild flex knee  -SI    Stairs Assessment/Treatment    Stairs, Comment REcirporcal on stairs with railing but discomfort descending.  -SI      User Key  (r) = Recorded By, (t) = Taken By, (c) = Cosigned By    Initials Name Provider Type    DARIAN Kelsey Luke PTA Physical Therapy Assistant                            PT Assessment/Plan       03/03/17 0923 03/03/17 0805    PT Assessment    Assessment Comments Pt is 5 1/2 weeks s/p L knee scope. She is still having pain with ambulation up to 5/10 and mild edema. She has near normal gait on level surfaces, sometimes with a flexed knee. She has improved her ROM to 2-120/127 deg, strength 4+/5, balance in SLS 20 sec, and she is reciprocal on stairs, but has pain with descending. 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 She would continue to benefit from therapy to address her strength, pain and function.   -    -SI    Please refer to paper survey for additional self-reported information Yes  -     Rehab Potential Good  -     Patient/caregiver participated in establishment of treatment plan and goals Yes  -     Patient would benefit from skilled therapy intervention Yes  -     PT Plan    PT Frequency 2x/week  -     PT Plan Comments cont 2x week for ROM, strength, and pain control  -       User Key  (r) = Recorded By, (t) = Taken By, (c) = Cosigned By    Initials Name Provider Type     Kierra Chavez, PT Physical Therapist    DARIAN Luke PTA Physical Therapy Assistant                Modalities       03/03/17 0700          Ice    Ice Applied Yes  -SI      Location left knee  -SI      Rx Minutes 12 mins  -SI      Ice S/P Rx Yes  -SI        User Key  (r) = Recorded By, (t) = Taken By, (c) = Cosigned By    Initials Name Provider  "Type    DARIAN Luke PTA Physical Therapy Assistant                Exercises       03/03/17 0700          Subjective Comments    Subjective Comments Pt again frustrated  stating \"I can't walk over here to PT from the [facility] i work in without pain in knee and i feel i should be able to do so by now.    -SI      Subjective Pain    Able to rate subjective pain? yes  -SI      Pre-Treatment Pain Level 5  -SI      Post-Treatment Pain Level 5  -SI      Subjective Pain Comment 5 today walking  -SI      Exercise 1    Exercise Name 1 see chart for exercises performed  -SI        User Key  (r) = Recorded By, (t) = Taken By, (c) = Cosigned By    Initials Name Provider Type    DARIAN Luke PTA Physical Therapy Assistant                               PT OP Goals       03/03/17 0800       PT Short Term Goals    STG 4 Patient will ambulate without assistive device community distances with near normal gait  -SI     STG 4 Progress Met   can depend on pain level  -SI     Long Term Goals    LTG 1 Pt to improve knee ROM to ext=0 deg and flex=120 deg for improved gait pattern  -SI     LTG 1 Progress Partially Met   met for passive, ext -2 active  -SI     LTG 3 Pt to improve score on Knee Outcome Survey by 40% for overall functional improvement  -SI     LTG 3 Progress Ongoing   improved 21%  -SI     LTG 5 Patient will demonstrate an independent HEP for core and knee strength and flexibility/ROM.  -SI     LTG 5 Progress Met  -SI     Time Calculation    PT Goal Re-Cert Due Date 04/03/17  -       User Key  (r) = Recorded By, (t) = Taken By, (c) = Cosigned By    Initials Name Provider Type     Kierra Chavez, PT Physical Therapist    DARIAN Luke PTA Physical Therapy Assistant                Therapy Education       03/03/17 0802          Therapy Education    Given HEP;Symptoms/condition management  -SI      Program Reinforced  -SI      How Provided Verbal;Demonstration;Written  -SI      Provided to Patient  -SI      " Level of Understanding Teach back education performed  -SI        User Key  (r) = Recorded By, (t) = Taken By, (c) = Cosigned By    Initials Name Provider Type    DARIAN Luke PTA Physical Therapy Assistant                Outcome Measures       03/03/17 0900          Knee Outcome Score    Knee Outcome Score Comments 61%  -LH      Functional Assessment    Outcome Measure Options Knee Outcome Score- ADL  -        User Key  (r) = Recorded By, (t) = Taken By, (c) = Cosigned By    Initials Name Provider Type    KOMAL Chavez, PT Physical Therapist            Time Calculation:   Start Time: 0715  Stop Time: 0815  Time Calculation (min): 60 min        PT G-Codes  Outcome Measure Options: Knee Outcome Score- ADL         Kierra Chavez, PT  3/3/2017

## 2017-03-07 ENCOUNTER — HOSPITAL ENCOUNTER (OUTPATIENT)
Dept: PHYSICAL THERAPY | Facility: HOSPITAL | Age: 52
Setting detail: THERAPIES SERIES
Discharge: HOME | End: 2017-03-07

## 2017-03-07 DIAGNOSIS — M25.562 ACUTE PAIN OF LEFT KNEE: Primary | ICD-10-CM

## 2017-03-07 DIAGNOSIS — R26.9 ABNORMAL GAIT: ICD-10-CM

## 2017-03-07 DIAGNOSIS — Z98.890 S/P LEFT KNEE ARTHROSCOPY: ICD-10-CM

## 2017-03-07 PROCEDURE — 97110 THERAPEUTIC EXERCISES: CPT

## 2017-03-07 NOTE — THERAPY DISCHARGE NOTE
Outpatient Physical Therapy Ortho Treatment Note/Discharge Summary   [place]     Patient Name: Alem Arzate  : 1965  MRN: [id number]  Today's Date: 3/7/2017      Visit Date: 2017    Visit Dx:    ICD-10-CM ICD-9-CM   1. Acute pain of left knee M25.562 719.46   2. S/P left knee arthroscopy Z98.890 V45.89   3. Abnormal gait R26.9 781.2       There is no problem list on file for this patient.       Past Medical History   Diagnosis Date   • Acute meniscal tear of left knee    • Arthritis         Past Surgical History   Procedure Laterality Date   • Bladder repair     • Knee meniscal repair Right    • Tooth extraction     • Knee arthroscopy Left 2017     Procedure: LEFT KNEE ARTHROSCOPY WITH CHONDROPLASTY  AND  DRILLING;  Surgeon: Colt Fournier MD;  Location: [facility] [place];  Service:              PT Ortho       17 1600    Subjective Comments    Subjective Comments pt states she is ready to stop PT.  RTMD tomorrow  -SI    Subjective Pain    Able to rate subjective pain? yes  -SI    Pre-Treatment Pain Level 1  -SI    Post-Treatment Pain Level 1  -SI    Subjective Pain Comment  4 with walking short distances  -SI    Posture/Observations    Observations Edema   very minimal now and no warmth  -SI    Left Knee    Extension/Flexion ROM Details -2 to 121/130  -SI    Gait Assessment/Treatment    Gait, Comment very mild flex knee gait  -SI    Stairs Assessment/Treatment    Stairs, Comment REciprocal but painful descending  -SI      User Key  (r) = Recorded By, (t) = Taken By, (c) = Cosigned By    Initials Name Provider Type    DARIAN Luke PTA Physical Therapy Assistant                            PT Assessment/Plan       17 1628       PT Assessment    Assessment Comments Pt RTMD tomorrow.  She is ready to DC PT.  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  See letter to MD.  -SI       User  Key  (r) = Recorded By, (t) = Taken By, (c) = Cosigned By    Initials Name Provider Type    DARIAN Luke PTA Physical Therapy Assistant                    Exercises       03/07/17 1600          Subjective Comments    Subjective Comments pt states she is ready to stop PT.  RTMD tomorrow  -SI      Subjective Pain    Able to rate subjective pain? yes  -SI      Pre-Treatment Pain Level 1  -SI      Post-Treatment Pain Level 1  -SI      Subjective Pain Comment  4 with walking short distances  -SI      Exercise 1    Exercise Name 1 see chart for exercises performed  -SI        User Key  (r) = Recorded By, (t) = Taken By, (c) = Cosigned By    Initials Name Provider Type    DARIAN Luke PTA Physical Therapy Assistant                               PT OP Goals       03/07/17 1600       Long Term Goals    LTG 1 Pt to improve knee ROM to ext=0 deg and flex=120 deg for improved gait pattern  -SI     LTG 1 Progress Partially Met   ext -2  -SI       User Key  (r) = Recorded By, (t) = Taken By, (c) = Cosigned By    Initials Name Provider Type    DARIAN Luke PTA Physical Therapy Assistant                Therapy Education       03/07/17 1627          Therapy Education    Given HEP;Symptoms/condition management  -SI      Program Reinforced  -SI      How Provided Verbal;Demonstration;Written  -SI      Provided to Patient  -SI      Level of Understanding Teach back education performed  -SI        User Key  (r) = Recorded By, (t) = Taken By, (c) = Cosigned By    Initials Name Provider Type    DARIAN Luke PTA Physical Therapy Assistant                Time Calculation:   Start Time: 1545  Stop Time: 1630  Time Calculation (min): 45 min    Therapy Charges for Today     Code Description Service Date Service Provider Modifiers Qty    88863120819 HC PT THER PROC EA 15 MIN 3/7/2017 Kelsey Luke PTA GP 3                OP PT Discharge Summary  Date of Discharge: 03/07/17  Reason for Discharge: Independent  Outcomes  Achieved: Patient able to partially acheive established goals  Discharge Destination: Home with home program  Discharge Instructions: HEP daily.  Elevation and ice as needed.      Kelsey Luke, PTA  3/7/2017

## 2017-03-10 ENCOUNTER — APPOINTMENT (OUTPATIENT)
Dept: PHYSICAL THERAPY | Facility: HOSPITAL | Age: 52
End: 2017-03-10

## 2017-07-10 ENCOUNTER — HOSPITAL ENCOUNTER (OUTPATIENT)
Dept: OTHER | Facility: HOSPITAL | Age: 52
Discharge: HOME | End: 2017-07-10
Attending: FAMILY MEDICINE | Admitting: FAMILY MEDICINE

## 2017-07-10 LAB
ALBUMIN SERPL-MCNC: 4.4 G/DL (ref 3.5–4.8)
ALBUMIN/GLOB SERPL: 1.7 {RATIO} (ref 1–1.7)
ALP SERPL-CCNC: 74 IU/L (ref 32–91)
ALT SERPL-CCNC: 20 IU/L (ref 14–54)
ANION GAP SERPL CALC-SCNC: 13 MMOL/L (ref 10–20)
AST SERPL-CCNC: 18 IU/L (ref 15–41)
BASOPHILS # BLD AUTO: 0 10*3/UL (ref 0–0.2)
BASOPHILS NFR BLD AUTO: 0 % (ref 0–2)
BILIRUB SERPL-MCNC: 0.8 MG/DL (ref 0.3–1.2)
BILIRUB UR QL STRIP: NEGATIVE MG/DL
BUN SERPL-MCNC: 16 MG/DL (ref 8–20)
BUN/CREAT SERPL: 20 (ref 5.4–26.2)
CALCIUM SERPL-MCNC: 9.1 MG/DL (ref 8.9–10.3)
CASTS URNS QL MICRO: NORMAL /[LPF]
CHLORIDE SERPL-SCNC: 104 MMOL/L (ref 101–111)
CHOLEST SERPL-MCNC: 180 MG/DL
CHOLEST/HDLC SERPL: 4.4 {RATIO}
COLOR UR: YELLOW
CONV BACTERIA IN URINE MICRO: NEGATIVE
CONV CLARITY OF URINE: CLEAR
CONV CO2: 23 MMOL/L (ref 22–32)
CONV HYALINE CASTS IN URINE MICRO: 1 /[LPF] (ref 0–5)
CONV LDL CHOLESTEROL DIRECT: 89 MG/DL (ref 0–100)
CONV PROTEIN IN URINE BY AUTOMATED TEST STRIP: NEGATIVE MG/DL
CONV SMALL ROUND CELLS: NORMAL /[HPF]
CONV TOTAL PROTEIN: 7 G/DL (ref 6.1–7.9)
CONV UROBILINOGEN IN URINE BY AUTOMATED TEST STRIP: 0.2 MG/DL
CREAT UR-MCNC: 0.8 MG/DL (ref 0.4–1)
CULTURE INDICATED?: NORMAL
DIFFERENTIAL METHOD BLD: (no result)
EOSINOPHIL # BLD AUTO: 0.1 10*3/UL (ref 0–0.3)
EOSINOPHIL # BLD AUTO: 1 % (ref 0–3)
ERYTHROCYTE [DISTWIDTH] IN BLOOD BY AUTOMATED COUNT: 13.3 % (ref 11.5–14.5)
GLOBULIN UR ELPH-MCNC: 2.6 G/DL (ref 2.5–3.8)
GLUCOSE SERPL-MCNC: 100 MG/DL (ref 65–99)
GLUCOSE UR QL: NEGATIVE MG/DL
HCT VFR BLD AUTO: 42.4 % (ref 35–49)
HDLC SERPL-MCNC: 41 MG/DL
HGB BLD-MCNC: 14.9 G/DL (ref 12–15)
HGB UR QL STRIP: NEGATIVE
KETONES UR QL STRIP: NEGATIVE MG/DL
LDLC/HDLC SERPL: 2.2 {RATIO}
LEUKOCYTE ESTERASE UR QL STRIP: NEGATIVE
LIPID INTERPRETATION: ABNORMAL
LYMPHOCYTES # BLD AUTO: 2.6 10*3/UL (ref 0.8–4.8)
LYMPHOCYTES NFR BLD AUTO: 21 % (ref 18–42)
MCH RBC QN AUTO: 32 PG (ref 26–32)
MCHC RBC AUTO-ENTMCNC: 35.1 G/DL (ref 32–36)
MCV RBC AUTO: 91.3 FL (ref 80–94)
MONOCYTES # BLD AUTO: 0.9 10*3/UL (ref 0.1–1.3)
MONOCYTES NFR BLD AUTO: 7 % (ref 2–11)
NEUTROPHILS # BLD AUTO: 8.6 10*3/UL (ref 2.3–8.6)
NEUTROPHILS NFR BLD AUTO: 71 % (ref 50–75)
NITRITE UR QL STRIP: NEGATIVE
NRBC BLD AUTO-RTO: 0 /100{WBCS}
NRBC/RBC NFR BLD MANUAL: 0 10*3/UL
PH UR STRIP.AUTO: 6 [PH] (ref 4.5–8)
PLATELET # BLD AUTO: 215 10*3/UL (ref 150–450)
PMV BLD AUTO: 8.5 FL (ref 7.4–10.4)
POTASSIUM SERPL-SCNC: 4 MMOL/L (ref 3.6–5.1)
RBC # BLD AUTO: 4.65 10*6/UL (ref 4–5.4)
RBC #/AREA URNS HPF: 1 /[HPF] (ref 0–3)
SODIUM SERPL-SCNC: 136 MMOL/L (ref 136–144)
SP GR UR: 1.01 (ref 1–1.03)
SPECIMEN SOURCE: NORMAL
SPERM URNS QL MICRO: NORMAL /[HPF]
SQUAMOUS SPT QL MICRO: 2 /[HPF] (ref 0–5)
TRIGL SERPL-MCNC: 275 MG/DL
TSH SERPL-ACNC: 2.72 UIU/ML (ref 0.34–5.6)
UNIDENT CRYS URNS QL MICRO: NORMAL /[HPF]
VLDLC SERPL CALC-MCNC: 49.8 MG/DL
WBC # BLD AUTO: 12.3 10*3/UL (ref 4.5–11.5)
WBC #/AREA URNS HPF: 0 /[HPF] (ref 0–5)
YEAST SPEC QL WET PREP: NORMAL /[HPF]

## 2017-12-04 ENCOUNTER — HOSPITAL ENCOUNTER (OUTPATIENT)
Dept: MAMMOGRAPHY | Facility: HOSPITAL | Age: 52
Discharge: HOME | End: 2017-12-04
Attending: OBSTETRICS & GYNECOLOGY | Admitting: OBSTETRICS & GYNECOLOGY

## 2018-07-18 ENCOUNTER — HOSPITAL ENCOUNTER (OUTPATIENT)
Dept: FAMILY MEDICINE CLINIC | Facility: CLINIC | Age: 53
Setting detail: SPECIMEN
Discharge: HOME | End: 2018-07-18
Attending: FAMILY MEDICINE | Admitting: FAMILY MEDICINE

## 2018-07-19 LAB
ALBUMIN SERPL-MCNC: 4.5 G/DL (ref 3.5–4.8)
ALBUMIN/GLOB SERPL: 1.7 {RATIO} (ref 1–1.7)
ALP SERPL-CCNC: 59 IU/L (ref 32–91)
ALT SERPL-CCNC: 19 IU/L (ref 14–54)
ANION GAP SERPL CALC-SCNC: 13.2 MMOL/L (ref 10–20)
AST SERPL-CCNC: 16 IU/L (ref 15–41)
BASOPHILS # BLD AUTO: 0.1 10*3/UL (ref 0–0.2)
BASOPHILS NFR BLD AUTO: 1 % (ref 0–2)
BILIRUB SERPL-MCNC: 0.7 MG/DL (ref 0.3–1.2)
BILIRUB UR QL STRIP: NEGATIVE MG/DL
BUN SERPL-MCNC: 11 MG/DL (ref 8–20)
BUN/CREAT SERPL: 13.8 (ref 5.4–26.2)
CALCIUM SERPL-MCNC: 9.5 MG/DL (ref 8.9–10.3)
CASTS URNS QL MICRO: ABNORMAL /[LPF]
CHLORIDE SERPL-SCNC: 104 MMOL/L (ref 101–111)
CHOLEST SERPL-MCNC: 187 MG/DL
CHOLEST/HDLC SERPL: 4.7 {RATIO}
COLOR UR: YELLOW
CONV BACTERIA IN URINE MICRO: NEGATIVE
CONV CLARITY OF URINE: ABNORMAL
CONV CO2: 24 MMOL/L (ref 22–32)
CONV HYALINE CASTS IN URINE MICRO: ABNORMAL /[LPF] (ref 0–5)
CONV LDL CHOLESTEROL DIRECT: 121 MG/DL (ref 0–100)
CONV PROTEIN IN URINE BY AUTOMATED TEST STRIP: NEGATIVE MG/DL
CONV SMALL ROUND CELLS: ABNORMAL /[HPF]
CONV TOTAL PROTEIN: 7.2 G/DL (ref 6.1–7.9)
CONV UROBILINOGEN IN URINE BY AUTOMATED TEST STRIP: 0.2 MG/DL
CREAT UR-MCNC: 0.8 MG/DL (ref 0.4–1)
CULTURE INDICATED?: ABNORMAL
DIFFERENTIAL METHOD BLD: (no result)
EOSINOPHIL # BLD AUTO: 0.1 10*3/UL (ref 0–0.3)
EOSINOPHIL # BLD AUTO: 1 % (ref 0–3)
ERYTHROCYTE [DISTWIDTH] IN BLOOD BY AUTOMATED COUNT: 12.6 % (ref 11.5–14.5)
GLOBULIN UR ELPH-MCNC: 2.7 G/DL (ref 2.5–3.8)
GLUCOSE SERPL-MCNC: 96 MG/DL (ref 65–99)
GLUCOSE UR QL: NEGATIVE MG/DL
HCT VFR BLD AUTO: 42.2 % (ref 35–49)
HDLC SERPL-MCNC: 40 MG/DL
HGB BLD-MCNC: 14.8 G/DL (ref 12–15)
HGB UR QL STRIP: ABNORMAL
KETONES UR QL STRIP: NEGATIVE MG/DL
LDLC/HDLC SERPL: 3 {RATIO}
LEUKOCYTE ESTERASE UR QL STRIP: NEGATIVE
LIPID INTERPRETATION: ABNORMAL
LYMPHOCYTES # BLD AUTO: 2.8 10*3/UL (ref 0.8–4.8)
LYMPHOCYTES NFR BLD AUTO: 30 % (ref 18–42)
MCH RBC QN AUTO: 32.1 PG (ref 26–32)
MCHC RBC AUTO-ENTMCNC: 35.1 G/DL (ref 32–36)
MCV RBC AUTO: 91.4 FL (ref 80–94)
MONOCYTES # BLD AUTO: 0.8 10*3/UL (ref 0.1–1.3)
MONOCYTES NFR BLD AUTO: 8 % (ref 2–11)
NEUTROPHILS # BLD AUTO: 5.8 10*3/UL (ref 2.3–8.6)
NEUTROPHILS NFR BLD AUTO: 60 % (ref 50–75)
NITRITE UR QL STRIP: NEGATIVE
NRBC BLD AUTO-RTO: 0 /100{WBCS}
NRBC/RBC NFR BLD MANUAL: 0 10*3/UL
PH UR STRIP.AUTO: 5.5 [PH] (ref 4.5–8)
PLATELET # BLD AUTO: (no result) 10*3/UL (ref 150–450)
PMV BLD AUTO: 9.6 FL (ref 7.4–10.4)
POTASSIUM SERPL-SCNC: 4.2 MMOL/L (ref 3.6–5.1)
RBC # BLD AUTO: 4.62 10*6/UL (ref 4–5.4)
RBC #/AREA URNS HPF: 2 /[HPF] (ref 0–3)
SODIUM SERPL-SCNC: 137 MMOL/L (ref 136–144)
SP GR UR: 1.02 (ref 1–1.03)
SPERM URNS QL MICRO: ABNORMAL /[HPF]
SQUAMOUS SPT QL MICRO: 10 /[HPF] (ref 0–5)
TRIGL SERPL-MCNC: 184 MG/DL
UNIDENT CRYS URNS QL MICRO: ABNORMAL /[HPF]
VLDLC SERPL CALC-MCNC: 26.4 MG/DL
WBC # BLD AUTO: 9.5 10*3/UL (ref 4.5–11.5)
WBC #/AREA URNS HPF: 3 /[HPF] (ref 0–5)
YEAST SPEC QL WET PREP: ABNORMAL /[HPF]

## 2018-11-13 ENCOUNTER — HOSPITAL ENCOUNTER (OUTPATIENT)
Dept: MAMMOGRAPHY | Facility: HOSPITAL | Age: 53
Discharge: HOME | End: 2018-11-13
Attending: OBSTETRICS & GYNECOLOGY | Admitting: OBSTETRICS & GYNECOLOGY

## 2019-07-29 ENCOUNTER — OFFICE VISIT (OUTPATIENT)
Dept: FAMILY MEDICINE CLINIC | Facility: CLINIC | Age: 54
End: 2019-07-29

## 2019-07-29 VITALS
BODY MASS INDEX: 29.09 KG/M2 | HEART RATE: 61 BPM | SYSTOLIC BLOOD PRESSURE: 114 MMHG | WEIGHT: 180.2 LBS | RESPIRATION RATE: 12 BRPM | DIASTOLIC BLOOD PRESSURE: 76 MMHG

## 2019-07-29 DIAGNOSIS — Z00.00 ENCOUNTER FOR GENERAL ADULT MEDICAL EXAMINATION WITHOUT ABNORMAL FINDINGS: Primary | ICD-10-CM

## 2019-07-29 DIAGNOSIS — L57.0 ACTINIC KERATOSIS: ICD-10-CM

## 2019-07-29 PROBLEM — E78.5 HYPERLIPIDEMIA: Status: ACTIVE | Noted: 2018-07-18

## 2019-07-29 PROCEDURE — 99396 PREV VISIT EST AGE 40-64: CPT | Performed by: FAMILY MEDICINE

## 2019-07-29 RX ORDER — TOLTERODINE TARTRATE 1 MG/1
1 TABLET, EXTENDED RELEASE ORAL 2 TIMES DAILY
Refills: 12 | COMMUNITY
Start: 2019-04-28 | End: 2019-07-29

## 2019-07-29 NOTE — PROGRESS NOTES
Rooming Tab(CC,VS,Pt Hx,Fall Screen)  Chief Complaint   Patient presents with   • Annual Exam       Subjective 54-year-old here for physical.  Patient exercises 45 minutes 3 times a week plus she does golf.  She denies any chest pain or dizziness or syncope with exertion.  She has no BERNAL.  Her bowels are working normally and she has no complaints of hematochezia melena or hematemesis.  She denies any blood in her urine.  She has no complaints today other than to see a dermatologist for some spots on her forehead neck on the right side.  She tries to use sunscreen and wear a hat but does not always do this.    I have reviewed and updated her medications, medical history and problem list during today's office visit.     Patient Care Team:  Chad Uribe MD as PCP - General    Problem List Tab  Medications Tab  Synopsis Tab  Chart Review Tab  Care Everywhere Tab  Immunizations Tab  Patient History Tab    Social History     Tobacco Use   • Smoking status: Never Smoker   • Smokeless tobacco: Never Used   Substance Use Topics   • Alcohol use: Yes     Alcohol/week: 2.4 oz     Types: 2 Cans of beer, 2 Shots of liquor per week     Frequency: 2-4 times a month     Drinks per session: 3 or 4     Binge frequency: Less than monthly       Review of Systems   Constitutional: Negative for chills, fatigue and fever.   HENT: Negative for nosebleeds.    Respiratory: Negative for chest tightness and shortness of breath.    Cardiovascular: Negative for chest pain and palpitations.   Gastrointestinal: Negative for blood in stool.   Genitourinary: Negative for hematuria.   Neurological: Negative for dizziness and syncope.   Psychiatric/Behavioral: Negative for depressed mood. The patient is not nervous/anxious.        Objective     Rooming Tab(CC,VS,Pt Hx,Fall Screen)  /76 (BP Location: Right arm, Patient Position: Sitting, Cuff Size: Large Adult)   Pulse 61   Resp 12   Wt 81.7 kg (180 lb 3.2 oz)   BMI 29.09 kg/m²     Body  mass index is 29.09 kg/m².    Physical Exam   Constitutional: She is oriented to person, place, and time. She appears well-developed and well-nourished. She is cooperative.   Excellent weight loss   HENT:   Head: Normocephalic and atraumatic.   Eyes: Conjunctivae and EOM are normal. Pupils are equal, round, and reactive to light.   Neck: Trachea normal and normal range of motion. Neck supple. No hepatojugular reflux and no JVD present. Carotid bruit is not present. No thyroid mass and no thyromegaly present.   Cardiovascular: Normal rate, regular rhythm, normal heart sounds, intact distal pulses and normal pulses.   No murmur heard.  Pulmonary/Chest: Effort normal and breath sounds normal. She exhibits no tenderness.   Abdominal: Soft. Normal appearance and bowel sounds are normal. She exhibits no distension and no mass. There is no hepatosplenomegaly. There is no tenderness.   Musculoskeletal: Normal range of motion. She exhibits no edema.   Lymphadenopathy:     She has no cervical adenopathy.   Neurological: She is alert and oriented to person, place, and time. She has normal strength and normal reflexes. No cranial nerve deficit or sensory deficit. She displays a negative Romberg sign.   Skin: Skin is warm and dry. Turgor is normal.   Scaly plaque right cheek as well as right side of neck   Psychiatric: She has a normal mood and affect. Her speech is normal and behavior is normal. Judgment and thought content normal. Cognition and memory are normal.   Nursing note and vitals reviewed.       Statin Choice Calculator  Data Reviewed:                   Assessment/Plan   Order Review Tab  Health Maintenance Tab  Patient Plan/Order Tab  Diagnoses and all orders for this visit:    1. Encounter for general adult medical examination without abnormal findings (Primary)  -     CBC & Differential; Future  -     Comprehensive Metabolic Panel; Future  -     Lipid Panel; Future  -     Urinalysis With Culture If Indicated -;  Future  -     TSH; Future    2. Actinic keratosis  Comments:  Sunscreen and sun avoidance.  We will have see dermatology  Orders:  -     Ambulatory Referral to Dermatology    Patient was given instructions and counseling regarding her condition or for health maintenance advice. Please see specific information pulled into the AVS by me.   Patient sees her gynecologist yearly and does her mammograms through her GYN.    Wrapup Tab  Return in about 1 year (around 7/29/2020) for Annual physical.

## 2019-07-29 NOTE — PATIENT INSTRUCTIONS
Calorie Counting for Weight Loss  Calories are units of energy. Your body needs a certain amount of calories from food to keep you going throughout the day. When you eat more calories than your body needs, your body stores the extra calories as fat. When you eat fewer calories than your body needs, your body burns fat to get the energy it needs.  Calorie counting means keeping track of how many calories you eat and drink each day. Calorie counting can be helpful if you need to lose weight. If you make sure to eat fewer calories than your body needs, you should lose weight. Ask your health care provider what a healthy weight is for you.  For calorie counting to work, you will need to eat the right number of calories in a day in order to lose a healthy amount of weight per week. A dietitian can help you determine how many calories you need in a day and will give you suggestions on how to reach your calorie goal.  · A healthy amount of weight to lose per week is usually 1-2 lb (0.5-0.9 kg). This usually means that your daily calorie intake should be reduced by 500-750 calories.  · Eating 1,200 - 1,500 calories per day can help most women lose weight.  · Eating 1,500 - 1,800 calories per day can help most men lose weight.    What is my plan?  My goal is to have __________ calories per day.  If I have this many calories per day, I should lose around __________ pounds per week.  What do I need to know about calorie counting?  In order to meet your daily calorie goal, you will need to:  · Find out how many calories are in each food you would like to eat. Try to do this before you eat.  · Decide how much of the food you plan to eat.  · Write down what you ate and how many calories it had. Doing this is called keeping a food log.    To successfully lose weight, it is important to balance calorie counting with a healthy lifestyle that includes regular activity. Aim for 150 minutes of moderate exercise (such as walking) or 75  minutes of vigorous exercise (such as running) each week.  Where do I find calorie information?    The number of calories in a food can be found on a Nutrition Facts label. If a food does not have a Nutrition Facts label, try to look up the calories online or ask your dietitian for help.  Remember that calories are listed per serving. If you choose to have more than one serving of a food, you will have to multiply the calories per serving by the amount of servings you plan to eat. For example, the label on a package of bread might say that a serving size is 1 slice and that there are 90 calories in a serving. If you eat 1 slice, you will have eaten 90 calories. If you eat 2 slices, you will have eaten 180 calories.  How do I keep a food log?  Immediately after each meal, record the following information in your food log:  · What you ate. Don't forget to include toppings, sauces, and other extras on the food.  · How much you ate. This can be measured in cups, ounces, or number of items.  · How many calories each food and drink had.  · The total number of calories in the meal.    Keep your food log near you, such as in a small notebook in your pocket, or use a mobile shea or website. Some programs will calculate calories for you and show you how many calories you have left for the day to meet your goal.  What are some calorie counting tips?  · Use your calories on foods and drinks that will fill you up and not leave you hungry:  ? Some examples of foods that fill you up are nuts and nut butters, vegetables, lean proteins, and high-fiber foods like whole grains. High-fiber foods are foods with more than 5 g fiber per serving.  ? Drinks such as sodas, specialty coffee drinks, alcohol, and juices have a lot of calories, yet do not fill you up.  · Eat nutritious foods and avoid empty calories. Empty calories are calories you get from foods or beverages that do not have many vitamins or protein, such as candy, sweets, and  "soda. It is better to have a nutritious high-calorie food (such as an avocado) than a food with few nutrients (such as a bag of chips).  · Know how many calories are in the foods you eat most often. This will help you calculate calorie counts faster.  · Pay attention to calories in drinks. Low-calorie drinks include water and unsweetened drinks.  · Pay attention to nutrition labels for \"low fat\" or \"fat free\" foods. These foods sometimes have the same amount of calories or more calories than the full fat versions. They also often have added sugar, starch, or salt, to make up for flavor that was removed with the fat.  · Find a way of tracking calories that works for you. Get creative. Try different apps or programs if writing down calories does not work for you.  What are some portion control tips?  · Know how many calories are in a serving. This will help you know how many servings of a certain food you can have.  · Use a measuring cup to measure serving sizes. You could also try weighing out portions on a kitchen scale. With time, you will be able to estimate serving sizes for some foods.  · Take some time to put servings of different foods on your favorite plates, bowls, and cups so you know what a serving looks like.  · Try not to eat straight from a bag or box. Doing this can lead to overeating. Put the amount you would like to eat in a cup or on a plate to make sure you are eating the right portion.  · Use smaller plates, glasses, and bowls to prevent overeating.  · Try not to multitask (for example, watch TV or use your computer) while eating. If it is time to eat, sit down at a table and enjoy your food. This will help you to know when you are full. It will also help you to be aware of what you are eating and how much you are eating.  What are tips for following this plan?  Reading food labels  · Check the calorie count compared to the serving size. The serving size may be smaller than what you are used to " eating.  · Check the source of the calories. Make sure the food you are eating is high in vitamins and protein and low in saturated and trans fats.  Shopping  · Read nutrition labels while you shop. This will help you make healthy decisions before you decide to purchase your food.  · Make a grocery list and stick to it.  Cooking  · Try to cook your favorite foods in a healthier way. For example, try baking instead of frying.  · Use low-fat dairy products.  Meal planning  · Use more fruits and vegetables. Half of your plate should be fruits and vegetables.  · Include lean proteins like poultry and fish.  How do I count calories when eating out?  · Ask for smaller portion sizes.  · Consider sharing an entree and sides instead of getting your own entree.  · If you get your own entree, eat only half. Ask for a box at the beginning of your meal and put the rest of your entree in it so you are not tempted to eat it.  · If calories are listed on the menu, choose the lower calorie options.  · Choose dishes that include vegetables, fruits, whole grains, low-fat dairy products, and lean protein.  · Choose items that are boiled, broiled, grilled, or steamed. Stay away from items that are buttered, battered, fried, or served with cream sauce. Items labeled “crispy” are usually fried, unless stated otherwise.  · Choose water, low-fat milk, unsweetened iced tea, or other drinks without added sugar. If you want an alcoholic beverage, choose a lower calorie option such as a glass of wine or light beer.  · Ask for dressings, sauces, and syrups on the side. These are usually high in calories, so you should limit the amount you eat.  · If you want a salad, choose a garden salad and ask for grilled meats. Avoid extra toppings like dozier, cheese, or fried items. Ask for the dressing on the side, or ask for olive oil and vinegar or lemon to use as dressing.  · Estimate how many servings of a food you are given. For example, a serving of  cooked rice is ½ cup or about the size of half a baseball. Knowing serving sizes will help you be aware of how much food you are eating at restaurants. The list below tells you how big or small some common portion sizes are based on everyday objects:  ? 1 oz--4 stacked dice.  ? 3 oz--1 deck of cards.  ? 1 tsp--1 die.  ? 1 Tbsp--½ a ping-pong ball.  ? 2 Tbsp--1 ping-pong ball.  ? ½ cup--½ baseball.  ? 1 cup--1 baseball.  Summary  · Calorie counting means keeping track of how many calories you eat and drink each day. If you eat fewer calories than your body needs, you should lose weight.  · A healthy amount of weight to lose per week is usually 1-2 lb (0.5-0.9 kg). This usually means reducing your daily calorie intake by 500-750 calories.  · The number of calories in a food can be found on a Nutrition Facts label. If a food does not have a Nutrition Facts label, try to look up the calories online or ask your dietitian for help.  · Use your calories on foods and drinks that will fill you up, and not on foods and drinks that will leave you hungry.  · Use smaller plates, glasses, and bowls to prevent overeating.  This information is not intended to replace advice given to you by your health care provider. Make sure you discuss any questions you have with your health care provider.  Document Released: 12/18/2006 Document Revised: 11/17/2017 Document Reviewed: 11/17/2017  [employer] Patient Education © 2019 [employer]      Exercising to Stay Healthy  Exercising regularly is important. It has many health benefits, such as:  · Improving your overall fitness, flexibility, and endurance.  · Increasing your bone density.  · Helping with weight control.  · Decreasing your body fat.  · Increasing your muscle strength.  · Reducing stress and tension.  · Improving your overall health.    In order to become healthy and stay healthy, it is recommended that you do moderate-intensity and vigorous-intensity exercise. You  can tell that you are exercising at a moderate intensity if you have a higher heart rate and faster breathing, but you are still able to hold a conversation. You can tell that you are exercising at a vigorous intensity if you are breathing much harder and faster and cannot hold a conversation while exercising.  How often should I exercise?  Choose an activity that you enjoy and set realistic goals. Your health care provider can help you to make an activity plan that works for you. Exercise regularly as directed by your health care provider. This may include:  · Doing resistance training twice each week, such as:  ? Push-ups.  ? Sit-ups.  ? Lifting weights.  ? Using resistance bands.  · Doing a given intensity of exercise for a given amount of time. Choose from these options:  ? 150 minutes of moderate-intensity exercise every week.  ? 75 minutes of vigorous-intensity exercise every week.  ? A mix of moderate-intensity and vigorous-intensity exercise every week.    Children, pregnant women, people who are out of shape, people who are overweight, and older adults may need to consult a health care provider for individual recommendations. If you have any sort of medical condition, be sure to consult your health care provider before starting a new exercise program.  What are some exercise ideas?  Some moderate-intensity exercise ideas include:  · Walking at a rate of 1 mile in 15 minutes.  · Biking.  · Hiking.  · Golfing.  · Dancing.    Some vigorous-intensity exercise ideas include:  · Walking at a rate of at least 4.5 miles per hour.  · Jogging or running at a rate of 5 miles per hour.  · Biking at a rate of at least 10 miles per hour.  · Lap swimming.  · Roller-skating or in-line skating.  · Cross-country skiing.  · Vigorous competitive sports, such as football, basketball, and soccer.  · Jumping rope.  · Aerobic dancing.    What are some everyday activities that can help me to get exercise?  · Yard work, such  as:  ? Pushing a .  ? Raking and bagging leaves.  · Washing and waxing your car.  · Pushing a stroller.  · Shoveling snow.  · Gardening.  · Washing windows or floors.  How can I be more active in my day-to-day activities?  · Use the stairs instead of the elevator.  · Take a walk during your lunch break.  · If you drive, park your car farther away from work or school.  · If you take public transportation, get off one stop early and walk the rest of the way.  · Make all of your phone calls while standing up and walking around.  · Get up, stretch, and walk around every 30 minutes throughout the day.  What guidelines should I follow while exercising?  · Do not exercise so much that you hurt yourself, feel dizzy, or get very short of breath.  · Consult your health care provider before starting a new exercise program.  · Wear comfortable clothes and shoes with good support.  · Drink plenty of water while you exercise to prevent dehydration or heat stroke. Body water is lost during exercise and must be replaced.  · Work out until you breathe faster and your heart beats faster.  This information is not intended to replace advice given to you by your health care provider. Make sure you discuss any questions you have with your health care provider.  Document Released: 01/20/2012 Document Revised: 05/25/2017 Document Reviewed: 05/21/2015  [employer] Patient Education © 2018 [employer]    Please check with insurance and get your adacel Tdap immunization  Please check with your insurance and get the new shingrix vaccine series to prevent shingles.

## 2019-07-30 ENCOUNTER — LAB (OUTPATIENT)
Dept: LAB | Facility: HOSPITAL | Age: 54
End: 2019-07-30

## 2019-07-30 DIAGNOSIS — Z00.00 ENCOUNTER FOR GENERAL ADULT MEDICAL EXAMINATION WITHOUT ABNORMAL FINDINGS: ICD-10-CM

## 2019-07-30 LAB
ALBUMIN SERPL-MCNC: 4.4 G/DL (ref 3.5–4.8)
ALBUMIN/GLOB SERPL: 1.6 G/DL (ref 1–1.7)
ALP SERPL-CCNC: 57 U/L (ref 32–91)
ALT SERPL W P-5'-P-CCNC: 17 U/L (ref 14–54)
ANION GAP SERPL CALCULATED.3IONS-SCNC: 13.1 MMOL/L (ref 5–15)
ARTICHOKE IGE QN: 140 MG/DL (ref 0–100)
AST SERPL-CCNC: 15 U/L (ref 15–41)
BASOPHILS # BLD AUTO: 0 10*3/MM3 (ref 0–0.2)
BASOPHILS NFR BLD AUTO: 0.6 % (ref 0–1.5)
BILIRUB SERPL-MCNC: 1 MG/DL (ref 0.3–1.2)
BILIRUB UR QL STRIP: NEGATIVE
BUN BLD-MCNC: 14 MG/DL (ref 8–20)
BUN/CREAT SERPL: 17.5 (ref 5.4–26.2)
CALCIUM SPEC-SCNC: 9.6 MG/DL (ref 8.9–10.3)
CHLORIDE SERPL-SCNC: 104 MMOL/L (ref 101–111)
CHOLEST SERPL-MCNC: 195 MG/DL
CLARITY UR: CLEAR
CO2 SERPL-SCNC: 26 MMOL/L (ref 22–32)
COLOR UR: YELLOW
CREAT BLD-MCNC: 0.8 MG/DL (ref 0.4–1)
DEPRECATED RDW RBC AUTO: 42 FL (ref 37–54)
EOSINOPHIL # BLD AUTO: 0.1 10*3/MM3 (ref 0–0.4)
EOSINOPHIL NFR BLD AUTO: 2.3 % (ref 0.3–6.2)
ERYTHROCYTE [DISTWIDTH] IN BLOOD BY AUTOMATED COUNT: 12.9 % (ref 12.3–15.4)
GFR SERPL CREATININE-BSD FRML MDRD: 74 ML/MIN/1.73
GLOBULIN UR ELPH-MCNC: 2.7 GM/DL (ref 2.5–3.8)
GLUCOSE BLD-MCNC: 103 MG/DL (ref 65–99)
GLUCOSE UR STRIP-MCNC: NEGATIVE MG/DL
HCT VFR BLD AUTO: 43.5 % (ref 34–46.6)
HDLC SERPL QL: 4.15
HDLC SERPL-MCNC: 47 MG/DL
HGB BLD-MCNC: 15.3 G/DL (ref 12–15.9)
HGB UR QL STRIP.AUTO: NEGATIVE
KETONES UR QL STRIP: NEGATIVE
LDLC/HDLC SERPL: 2.8 {RATIO}
LEUKOCYTE ESTERASE UR QL STRIP.AUTO: NEGATIVE
LYMPHOCYTES # BLD AUTO: 1.9 10*3/MM3 (ref 0.7–3.1)
LYMPHOCYTES NFR BLD AUTO: 35.8 % (ref 19.6–45.3)
MCH RBC QN AUTO: 32.5 PG (ref 26.6–33)
MCHC RBC AUTO-ENTMCNC: 35.2 G/DL (ref 31.5–35.7)
MCV RBC AUTO: 92.5 FL (ref 79–97)
MONOCYTES # BLD AUTO: 0.5 10*3/MM3 (ref 0.1–0.9)
MONOCYTES NFR BLD AUTO: 8.8 % (ref 5–12)
NEUTROPHILS # BLD AUTO: 2.8 10*3/MM3 (ref 1.7–7)
NEUTROPHILS NFR BLD AUTO: 52.5 % (ref 42.7–76)
NITRITE UR QL STRIP: NEGATIVE
NRBC BLD AUTO-RTO: 0.1 /100 WBC (ref 0–0.2)
PH UR STRIP.AUTO: 7 [PH] (ref 5–8)
PLATELET # BLD AUTO: 214 10*3/MM3 (ref 140–450)
PMV BLD AUTO: 8.6 FL (ref 6–12)
POTASSIUM BLD-SCNC: 4.1 MMOL/L (ref 3.6–5.1)
PROT SERPL-MCNC: 7.1 G/DL (ref 6.1–7.9)
PROT UR QL STRIP: NEGATIVE
RBC # BLD AUTO: 4.71 10*6/MM3 (ref 3.77–5.28)
SODIUM BLD-SCNC: 139 MMOL/L (ref 136–144)
SP GR UR STRIP: 1.01 (ref 1–1.03)
TRIGL SERPL-MCNC: 83 MG/DL
TSH SERPL DL<=0.05 MIU/L-ACNC: 1.35 MIU/ML (ref 0.34–5.6)
UROBILINOGEN UR QL STRIP: NORMAL
VLDLC SERPL-MCNC: 16.6 MG/DL
WBC NRBC COR # BLD: 5.3 10*3/MM3 (ref 3.4–10.8)

## 2019-07-30 PROCEDURE — 81003 URINALYSIS AUTO W/O SCOPE: CPT

## 2019-07-30 PROCEDURE — 80053 COMPREHEN METABOLIC PANEL: CPT

## 2019-07-30 PROCEDURE — 84443 ASSAY THYROID STIM HORMONE: CPT

## 2019-07-30 PROCEDURE — 36415 COLL VENOUS BLD VENIPUNCTURE: CPT

## 2019-07-30 PROCEDURE — 85025 COMPLETE CBC W/AUTO DIFF WBC: CPT

## 2019-07-30 PROCEDURE — 80061 LIPID PANEL: CPT

## 2019-08-15 ENCOUNTER — HOSPITAL ENCOUNTER (EMERGENCY)
Facility: HOSPITAL | Age: 54
Discharge: HOME | End: 2019-08-15
Admitting: EMERGENCY MEDICINE

## 2019-08-15 VITALS
TEMPERATURE: 98.2 F | HEIGHT: 66 IN | SYSTOLIC BLOOD PRESSURE: 167 MMHG | BODY MASS INDEX: 29.87 KG/M2 | HEART RATE: 73 BPM | WEIGHT: 185.85 LBS | OXYGEN SATURATION: 100 % | RESPIRATION RATE: 18 BRPM | DIASTOLIC BLOOD PRESSURE: 95 MMHG

## 2019-08-15 DIAGNOSIS — S00.531A CONTUSION OF LIP, INITIAL ENCOUNTER: ICD-10-CM

## 2019-08-15 DIAGNOSIS — S00.511A LIP ABRASION, INITIAL ENCOUNTER: Primary | ICD-10-CM

## 2019-08-15 PROCEDURE — 99283 EMERGENCY DEPT VISIT LOW MDM: CPT

## 2019-08-16 NOTE — ED PROVIDER NOTES
"Subjective   54-year-old female presents with an abrasion and contusion to her right lower lip.  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    1. Location: Right lower lip  2. Quality: Sore  3. Severity: Mild  4. Worsening factors: Denies  5. Alleviating factors: Ice  6. Onset: Prior to arrival  7. Radiation: Denies  8. Frequency: Remittent  9. Co-morbidities: None  10. Source: Patient              Review of Systems   Skin: Positive for wound.   Neurological: Negative for syncope and headaches.   All other systems reviewed and are negative.      Past Medical History:   Diagnosis Date   • Acute meniscal tear of left knee    • Arthritis        Allergies   Allergen Reactions   • Codeine Nausea Only       Past Surgical History:   Procedure Laterality Date   • BLADDER REPAIR  2001   • KNEE ARTHROSCOPY Left 1/25/2017    Procedure: LEFT KNEE ARTHROSCOPY WITH CHONDROPLASTY  AND  DRILLING;  Surgeon: Colt Fournier MD;  [place] [facility] [place] [facility] [place]:    • KNEE MENISCAL REPAIR Right    • TOOTH EXTRACTION         Family History   Problem Relation Age of Onset   • Diabetes Mother    • Lupus Mother    • COPD Father    • Lung cancer Father        Social History     Socioeconomic History   • Marital status:      Spouse name: Not on file   • Number of children: Not on file   • Years of education: Not on file   • Highest education level: Not on file   Tobacco Use   • Smoking status: Never Smoker   • Smokeless tobacco: Never Used   Substance and Sexual Activity   • Alcohol use: Yes     Alcohol/week: 2.4 oz     Types: 2 Cans of beer, 2 Shots of liquor per week     Frequency: 2-4 times a month     Drinks per session: 3 or 4     Binge frequency: Less than monthly   • Drug use: No   • Sexual activity: Defer           Objective   Physical Exam   Constitutional: She is oriented to person, place, and time. She appears " well-developed and well-nourished. No distress.   HENT:   Head: Normocephalic and atraumatic.   Mouth/Throat: Oropharynx is clear and moist and mucous membranes are normal. Normal dentition.       Normal dentition.   Eyes: Conjunctivae and EOM are normal. Pupils are equal, round, and reactive to light.   Neck: Normal range of motion. Neck supple.   Neurological: She is alert and oriented to person, place, and time.   Skin: Skin is warm and dry. Capillary refill takes less than 2 seconds.   Nursing note and vitals reviewed.      Procedures           ED Course        No radiology results for the last day  Medications - No data to display  Labs Reviewed - No data to display            MDM  Number of Diagnoses or Management Options  Contusion of lip, initial encounter:   Lip abrasion, initial encounter:   Diagnosis management comments:   Disposition/Treatment: Discussed results with patient, verbalized understanding.  Agreeable with plan of care.    Patient undressed and placed in gown for exam.  Abrasion cleansed with peroxide and normal saline, then bacitracin applied.  Patient given follow-up with PCP in 3 days for wound check.  Referral given with plastics, if desired.  Instructed to return to the ER for new or worsening symptoms.          Final diagnoses:   Lip abrasion, initial encounter   Contusion of lip, initial encounter            Miladis Ivey NP  08/15/19 [id number]

## 2019-08-16 NOTE — DISCHARGE INSTRUCTIONS
Cleanse twice daily with antibacterial soap and water, then apply bacitracin.  Apply ice every 2 hours while awake, on for 20 minutes.  Follow-up with plastics if desired.  Return to ER for new or worsening symptoms.

## 2019-10-22 ENCOUNTER — TRANSCRIBE ORDERS (OUTPATIENT)
Dept: ADMINISTRATIVE | Facility: HOSPITAL | Age: 54
End: 2019-10-22

## 2019-10-22 DIAGNOSIS — Z12.31 SCREENING MAMMOGRAM, ENCOUNTER FOR: Primary | ICD-10-CM

## 2019-11-14 ENCOUNTER — HOSPITAL ENCOUNTER (OUTPATIENT)
Dept: MAMMOGRAPHY | Facility: HOSPITAL | Age: 54
Discharge: HOME | End: 2019-11-14
Admitting: OBSTETRICS & GYNECOLOGY

## 2019-11-14 DIAGNOSIS — Z12.31 SCREENING MAMMOGRAM, ENCOUNTER FOR: ICD-10-CM

## 2019-11-14 PROCEDURE — 77063 BREAST TOMOSYNTHESIS BI: CPT

## 2019-11-14 PROCEDURE — 77067 SCR MAMMO BI INCL CAD: CPT

## 2019-12-27 RX ORDER — CALCIUM CARBONATE 200(500)MG
1 TABLET,CHEWABLE ORAL AS NEEDED
COMMUNITY

## 2020-01-03 ENCOUNTER — ANESTHESIA EVENT (OUTPATIENT)
Dept: GASTROENTEROLOGY | Facility: HOSPITAL | Age: 55
End: 2020-01-03

## 2020-01-06 ENCOUNTER — HOSPITAL ENCOUNTER (OUTPATIENT)
Facility: HOSPITAL | Age: 55
Setting detail: HOSPITAL OUTPATIENT SURGERY
Discharge: HOME | End: 2020-01-06
Attending: INTERNAL MEDICINE | Admitting: INTERNAL MEDICINE

## 2020-01-06 ENCOUNTER — ANESTHESIA (OUTPATIENT)
Dept: GASTROENTEROLOGY | Facility: HOSPITAL | Age: 55
End: 2020-01-06

## 2020-01-06 ENCOUNTER — ON CAMPUS - OUTPATIENT (AMBULATORY)
Dept: URBAN - METROPOLITAN AREA HOSPITAL 85 | Facility: HOSPITAL | Age: 55
End: 2020-01-06
Payer: COMMERCIAL

## 2020-01-06 VITALS
SYSTOLIC BLOOD PRESSURE: 121 MMHG | DIASTOLIC BLOOD PRESSURE: 70 MMHG | HEART RATE: 66 BPM | WEIGHT: 198.19 LBS | HEIGHT: 66 IN | TEMPERATURE: 98.7 F | RESPIRATION RATE: 20 BRPM | BODY MASS INDEX: 31.85 KG/M2 | OXYGEN SATURATION: 96 %

## 2020-01-06 DIAGNOSIS — D12.0 BENIGN NEOPLASM OF CECUM: ICD-10-CM

## 2020-01-06 DIAGNOSIS — K64.4 RESIDUAL HEMORRHOIDAL SKIN TAGS: ICD-10-CM

## 2020-01-06 DIAGNOSIS — Z86.010 PERSONAL HISTORY OF COLONIC POLYPS: ICD-10-CM

## 2020-01-06 DIAGNOSIS — K64.8 OTHER HEMORRHOIDS: ICD-10-CM

## 2020-01-06 DIAGNOSIS — K57.90 DIVERTICULOSIS OF INTESTINE, PART UNSPECIFIED, WITHOUT PERFO: ICD-10-CM

## 2020-01-06 DIAGNOSIS — K62.1 RECTAL POLYP: ICD-10-CM

## 2020-01-06 PROCEDURE — 88305 TISSUE EXAM BY PATHOLOGIST: CPT | Performed by: INTERNAL MEDICINE

## 2020-01-06 PROCEDURE — 25010000002 PROPOFOL 10 MG/ML EMULSION: Performed by: ANESTHESIOLOGY

## 2020-01-06 PROCEDURE — 45385 COLONOSCOPY W/LESION REMOVAL: CPT | Performed by: INTERNAL MEDICINE

## 2020-01-06 RX ORDER — SODIUM CHLORIDE 0.9 % (FLUSH) 0.9 %
3-10 SYRINGE (ML) INJECTION AS NEEDED
Status: DISCONTINUED | OUTPATIENT
Start: 2020-01-06 | End: 2020-01-06 | Stop reason: HOSPADM

## 2020-01-06 RX ORDER — SODIUM CHLORIDE 0.9 % (FLUSH) 0.9 %
3 SYRINGE (ML) INJECTION EVERY 12 HOURS SCHEDULED
Status: DISCONTINUED | OUTPATIENT
Start: 2020-01-06 | End: 2020-01-06 | Stop reason: HOSPADM

## 2020-01-06 RX ORDER — LIDOCAINE HYDROCHLORIDE 10 MG/ML
0.5 INJECTION, SOLUTION EPIDURAL; INFILTRATION; INTRACAUDAL; PERINEURAL ONCE AS NEEDED
Status: DISCONTINUED | OUTPATIENT
Start: 2020-01-06 | End: 2020-01-06 | Stop reason: HOSPADM

## 2020-01-06 RX ORDER — SODIUM CHLORIDE 9 MG/ML
9 INJECTION, SOLUTION INTRAVENOUS CONTINUOUS PRN
Status: DISCONTINUED | OUTPATIENT
Start: 2020-01-06 | End: 2020-01-06 | Stop reason: HOSPADM

## 2020-01-06 RX ORDER — PROPOFOL 10 MG/ML
VIAL (ML) INTRAVENOUS AS NEEDED
Status: DISCONTINUED | OUTPATIENT
Start: 2020-01-06 | End: 2020-01-06 | Stop reason: SURG

## 2020-01-06 RX ADMIN — PROPOFOL 220 MG: 10 INJECTION, EMULSION INTRAVENOUS at 09:11

## 2020-01-06 RX ADMIN — SODIUM CHLORIDE 9 ML/HR: 0.9 INJECTION, SOLUTION INTRAVENOUS at 08:42

## 2020-01-06 NOTE — DISCHARGE INSTRUCTIONS
A responsible adult should stay with you and you should rest quietly for the rest of the day.    Do not drink alcohol, drive, operate any heavy machinery or power tools or make any legal/important decisions for the next 24 hours.     Progress your diet as tolerated.  If you begin to experience severe pain, increased shortness of breath, racing heartbeat or a fever above 101 F, seek immediate medical attention.     Follow up with MD as instructed. Call office for results in 3 to 5 days if needed. [phone]

## 2020-01-06 NOTE — OP NOTE
COLONOSCOPY Procedure Report    Patient Name:  Alem Arzate  YOB: 1965    Date of Surgery:  1/6/2020     Pre-Op Diagnosis:  PERSONAL HX COLON POLYPS    Post-Op Diagnosis:     Post-Op Diagnosis Codes:     * Personal history of colonic polyps [Z86.010]  2 polyps were cold snared from cecum and rectum (5-8 mm)--completely removed  Moderate internal and external hemorrhoids  Mild L diverticulosis  Good prep    Procedure/CPT® Codes:      Procedure(s):  COLONOSCOPY WITH POLYPECTOMY X2    Staff:  Surgeon(s):  Miah Toribio MD         Anesthesia: Monitored Anesthesia Care    Implants:    Nothing was implanted during the procedure    Specimen:        See below    Complications:  NONE    Description of Procedure:  Informed consent was obtained for the procedure, including sedation.  Risks of perforation, hemorrhage, adverse drug reaction and aspiration were discussed.  The patient was brought into the endoscopy suite. Continuous cardiopulmonary monitoring was performed.  The patient was placed in the left lateral decubitus position. After adequate sedation was attained, the digital rectal exam was performed which was Hemorrhoids.  Subsequently, the Olympus colonoscope was inserted into the patient's rectum and advanced to the level of the cecum and terminal ileum EASILY without difficulty.  The bowel prep was GOOD.  Circumferential examination of the patient's colon was performed on scope withdrawal.  The cecum, ascending colon, and hepatic flexure were examined twice.  The transverse colon, splenic flexure, descending colon, and rectum were examined.  A retroflex exam was performed in the rectum which was hemorrhoids.  The bowel was decompressed, the scope was withdrawn from the patient, and the patient tolerated the procedure well. There were no immediate post-operative complications.     Findings:   Terminal ileum: NORMAL  Colon:    * Personal history of colonic polyps [Z86.010]  2 polyps were cold  snared from cecum and rectum (5-8 mm)--completely removed  Moderate internal and external hemorrhoids  Mild L diverticulosis  Good prep    Impression:     * Personal history of colonic polyps [Z86.010]  2 polyps were cold snared from cecum and rectum (5-8 mm)--completely removed  Moderate internal and external hemorrhoids  Mild L diverticulosis  Good prep    Recommendations:  Repeat colon 5 years  Await path    Santos MD     Date: 1/6/2020  Time: 9:36 AM

## 2020-01-06 NOTE — H&P
" LOS: 0 days   Patient Care Team:  Chad Uribe MD as PCP - General      Subjective     Interval History:     Subjective: Outpt COLON scope for polyp recheck since 2014 in rectum  No GI complaints  No chest pains or SOA  No fever or chills  No pain complaints.  No contraindications for MAC anesthesia    ROS:   No chest pain, shortness of breath, or cough. Agreeable to scope and anesthesia  No change since scanned H&P       Medication Review:     Current Facility-Administered Medications:   •  lidocaine PF 1% (XYLOCAINE) injection 0.5 mL, 0.5 mL, Injection, Once PRN, Franks, Anette, AA  •  sodium chloride 0.9 % flush 3 mL, 3 mL, Intravenous, Q12H, Franks, Anette, AA  •  sodium chloride 0.9 % flush 3-10 mL, 3-10 mL, Intravenous, PRN, Franks, Anette, AA  •  sodium chloride 0.9 % infusion, 9 mL/hr, Intravenous, Continuous PRN, Franks, Anette, AA, Last Rate: 9 mL/hr at 01/06/20 0842, 9 mL/hr at 01/06/20 0842      Objective     Vital Signs  Vitals:    12/27/19 1526 01/06/20 0828   BP:  135/72   Pulse:  75   Temp:  98.7 °F (37.1 °C)   TempSrc:  Oral   SpO2:  93%   Weight: 86.2 kg (190 lb) 89.9 kg (198 lb 3.1 oz)   Height: 167.6 cm (66\") 167.6 cm (66\")       Physical Exam:    General Appearance:    Awake and alert, in no acute distress   Head:    Normocephalic, without obvious abnormality   Eyes:          Conjunctivae normal, anicteric sclera   Throat:   No oral lesions, no thrush, oral mucosa moist   Neck:   No adenopathy, supple, no JVD   Lungs:     respirations regular, even and unlabored   Abdomen:     Soft, non-tender, no rebound or guarding, non-distended, no hepatosplenomegaly   Rectal:     Deferred   Extremities:   No edema, no cyanosis   Skin:   No bruising or rash, no jaundice        Results Review:    Lab Results (last 24 hours)     ** No results found for the last 24 hours. **          Imaging Results (Last 24 Hours)     ** No results found for the last 24 hours. **            Assessment/Plan "   Proceed with scope and MAC anesthesia    No change from office records    Santos MD  01/06/20  8:55 AM

## 2020-01-06 NOTE — ANESTHESIA PREPROCEDURE EVALUATION
Anesthesia Evaluation     Patient summary reviewed and Nursing notes reviewed   NPO Solid Status: > 8 hours  NPO Liquid Status: > 8 hours           Airway   Mallampati: II  TM distance: >3 FB  Neck ROM: full  No difficulty expected  Dental - normal exam     Pulmonary - negative pulmonary ROS and normal exam   Cardiovascular - normal exam    (+) hyperlipidemia,       Neuro/Psych- negative ROS  GI/Hepatic/Renal/Endo - negative ROS     Musculoskeletal     Abdominal  - normal exam    Bowel sounds: normal.   Substance History - negative use     OB/GYN negative ob/gyn ROS         Other   arthritis,                      Anesthesia Plan    ASA 2     MAC     intravenous induction

## 2020-01-06 NOTE — ANESTHESIA POSTPROCEDURE EVALUATION
Patient: Alem Arzate    Procedure Summary     Date:  01/06/20 Room / Location:  [facility] ENDOSCOPY 1 / [facility] ENDOSCOPY    Anesthesia Start:  0910 Anesthesia Stop:  0937    Procedure:  COLONOSCOPY WITH POLYPECTOMY X2 (N/A ) Diagnosis:       Personal history of colonic polyps      (PERSONAL HX COLON POLYPS)    Surgeon:  Miah Toribio MD Provider:  Miguel A Major MD    Anesthesia Type:  MAC ASA Status:  2          Anesthesia Type: MAC    Vitals  Vitals Value Taken Time   /70 1/6/2020  9:52 AM   Temp     Pulse 65 1/6/2020  9:52 AM   Resp 20 1/6/2020  9:52 AM   SpO2 97 % 1/6/2020  9:52 AM   Vitals shown include unvalidated device data.        Post Anesthesia Care and Evaluation    Patient location during evaluation: PACU  Patient participation: complete - patient participated  Level of consciousness: awake  Pain scale: See nurse's notes for pain score.  Pain management: adequate  Airway patency: patent  Anesthetic complications: No anesthetic complications  PONV Status: none  Cardiovascular status: acceptable  Respiratory status: acceptable  Hydration status: acceptable    Comments: Patient seen and examined postoperatively; vital signs stable; SpO2 greater than or equal to 90%; cardiopulmonary status stable; nausea/vomiting adequately controlled; pain adequately controlled; no apparent anesthesia complications; patient discharged from anesthesia care when discharge criteria were met

## 2020-01-07 LAB
LAB AP CASE REPORT: NORMAL
PATH REPORT.FINAL DX SPEC: NORMAL
PATH REPORT.GROSS SPEC: NORMAL

## 2020-01-21 ENCOUNTER — OFFICE VISIT (OUTPATIENT)
Dept: FAMILY MEDICINE CLINIC | Facility: CLINIC | Age: 55
End: 2020-01-21

## 2020-01-21 VITALS
TEMPERATURE: 98.7 F | DIASTOLIC BLOOD PRESSURE: 76 MMHG | WEIGHT: 193 LBS | SYSTOLIC BLOOD PRESSURE: 123 MMHG | HEART RATE: 71 BPM | RESPIRATION RATE: 12 BRPM | BODY MASS INDEX: 31.15 KG/M2

## 2020-01-21 DIAGNOSIS — M75.51 SUBACROMIAL BURSITIS OF RIGHT SHOULDER JOINT: ICD-10-CM

## 2020-01-21 DIAGNOSIS — M75.91 RIGHT SUPRASPINATUS TENDINITIS: Primary | ICD-10-CM

## 2020-01-21 PROCEDURE — 99213 OFFICE O/P EST LOW 20 MIN: CPT | Performed by: FAMILY MEDICINE

## 2020-01-21 RX ORDER — MELOXICAM 15 MG/1
15 TABLET ORAL DAILY
Qty: 30 TABLET | Refills: 0 | Status: SHIPPED | OUTPATIENT
Start: 2020-01-21 | End: 2020-07-24 | Stop reason: HOSPADM

## 2020-01-21 RX ORDER — TOLTERODINE TARTRATE 2 MG/1
2 TABLET, EXTENDED RELEASE ORAL EVERY MORNING
COMMUNITY
Start: 2020-01-15 | End: 2020-11-23

## 2020-01-21 NOTE — PROGRESS NOTES
Rooming Tab(CC,VS,Pt Hx,Fall Screen)  Chief Complaint   Patient presents with   • URI   • Shoulder Pain        Subjective    Patient is a 55 year-old female who presents with R shoulder pain that has been intermittent since Sept. 2019. She denies any specific injury at that time. She works as a referee for volleyball and basketball. During this activity is when she first noticed the pain. She abduct her shoulder and does not feel pain until her arm is raised to 90 degrees. She also reports moderate pain when moving arm across the midline of her body. She takes advil with only mild relief of pain.    I have reviewed and updated her medications, medical history and problem list during today's office visit.     Patient Care Team:  Chad Uribe MD as PCP - General    Problem List Tab  Medications Tab  Synopsis Tab  Chart Review Tab  Care Everywhere Tab  Immunizations Tab  Patient History Tab    Social History     Tobacco Use   • Smoking status: Never Smoker   • Smokeless tobacco: Never Used   Substance Use Topics   • Alcohol use: Yes     Alcohol/week: 4.0 standard drinks     Types: 2 Cans of beer, 2 Shots of liquor per week     Frequency: 2-4 times a month     Drinks per session: 3 or 4     Binge frequency: Less than monthly       Review of Systems   Constitutional: Negative for chills and fever.   Respiratory: Negative for chest tightness and shortness of breath.    Cardiovascular: Negative for chest pain and palpitations.   Musculoskeletal:        Shoulder pain   Neurological: Negative for dizziness and syncope.     Arthrocentesis  Date/Time: 1/22/2020 7:40 AM  Performed by: Chad Uribe MD  Authorized by: Chad Uribe MD   Indications: pain   Body area: shoulder  Joint: right shoulder  Local anesthesia used: yes    Anesthesia:  Local anesthesia used: yes  Local Anesthetic: lidocaine 1% without epinephrine  Anesthetic total: 3 mL    Sedation:  Patient sedated: no    Preparation: Patient was prepped and  draped in the usual sterile fashion.  Needle gauge: 25.  Ultrasound guidance: no  Approach: posterior  Aspirate amount: 0 mL  Patient tolerance: Patient tolerated the procedure well with no immediate complications          Objective     Rooming Tab(CC,VS,Pt Hx,Fall Screen)  /76 (BP Location: Left arm, Patient Position: Sitting, Cuff Size: Large Adult)   Pulse 71   Temp 98.7 °F (37.1 °C) (Oral)   Resp 12   Wt 87.5 kg (193 lb)   BMI 31.15 kg/m²     Body mass index is 31.15 kg/m².    Physical Exam   Constitutional: She is oriented to person, place, and time. She appears well-developed and well-nourished.   HENT:   Head: Normocephalic and atraumatic.   Neck: Normal range of motion. Neck supple.   Cardiovascular: Normal rate, regular rhythm and normal heart sounds.   Pulmonary/Chest: Effort normal.   Musculoskeletal:   Decreased range of motion right shoulder and abduction greater than 90 degrees.  Tenderness with external rotation.  She is weak against resistance   Neurological: She is alert and oriented to person, place, and time.   Psychiatric: She has a normal mood and affect. Her behavior is normal.   Nursing note and vitals reviewed.         Statin Choice Calculator  Data Reviewed:                   Assessment/Plan   Order Review Tab  Health Maintenance Tab  Patient Plan/Order Tab  Diagnoses and all orders for this visit:    1. Right supraspinatus tendinitis (Primary)  Assessment & Plan:  Went over different options of treatment for the patient.  She elects injection, prepped with Betadine, sterile procedure injected 3 cc of percent lidocaine and 1 cc of Kenalog right shoulder posterior approach.  Patient tolerated procedure no apparent complications.  Range of motion exercises discussed with the patient.  Stretching and band use as well.  If persistent she will call back and will consider physical therapy and consider imaging.  Mobic 15 mg daily for approximately 7 to 10 days      2. Subacromial  bursitis of right shoulder joint    Other orders  -     meloxicam (MOBIC) 15 MG tablet; Take 1 tablet by mouth Daily.  Dispense: 30 tablet; Refill: 0  -     Arthrocentesis      Wrapup Tab  Return if symptoms worsen or fail to improve.

## 2020-01-22 PROCEDURE — 20610 DRAIN/INJ JOINT/BURSA W/O US: CPT | Performed by: FAMILY MEDICINE

## 2020-01-22 RX ORDER — TRIAMCINOLONE ACETONIDE 40 MG/ML
40 INJECTION, SUSPENSION INTRA-ARTICULAR; INTRAMUSCULAR
Status: COMPLETED | OUTPATIENT
Start: 2020-01-22 | End: 2020-01-22

## 2020-01-22 RX ADMIN — TRIAMCINOLONE ACETONIDE 40 MG: 40 INJECTION, SUSPENSION INTRA-ARTICULAR; INTRAMUSCULAR at 07:40

## 2020-01-22 NOTE — ASSESSMENT & PLAN NOTE
Went over different options of treatment for the patient.  She elects injection, prepped with Betadine, sterile procedure injected 3 cc of percent lidocaine and 1 cc of Kenalog right shoulder posterior approach.  Patient tolerated procedure no apparent complications.  Range of motion exercises discussed with the patient.  Stretching and band use as well.  If persistent she will call back and will consider physical therapy and consider imaging.  Mobic 15 mg daily for approximately 7 to 10 days

## 2020-05-29 ENCOUNTER — TELEPHONE (OUTPATIENT)
Dept: FAMILY MEDICINE CLINIC | Facility: CLINIC | Age: 55
End: 2020-05-29

## 2020-05-29 DIAGNOSIS — M75.91 RIGHT SUPRASPINATUS TENDINITIS: ICD-10-CM

## 2020-05-29 DIAGNOSIS — M75.51 SUBACROMIAL BURSITIS OF RIGHT SHOULDER JOINT: Primary | ICD-10-CM

## 2020-05-29 NOTE — TELEPHONE ENCOUNTER
Patient called in stating her shoulder issue is back, she states she had spoken to the doctor in the past about getting an MRI or Physical Therapy. She would like to know the next steps to take.    Please call back to kae @ [phone]

## 2020-05-29 NOTE — TELEPHONE ENCOUNTER
Tell her I would start with physical therapy and we will set it up at [facility] physical therapy up here or at her choice

## 2020-06-09 ENCOUNTER — TREATMENT (OUTPATIENT)
Dept: PHYSICAL THERAPY | Facility: CLINIC | Age: 55
End: 2020-06-09

## 2020-06-09 DIAGNOSIS — M25.511 ACUTE PAIN OF RIGHT SHOULDER: Primary | ICD-10-CM

## 2020-06-09 PROCEDURE — 97110 THERAPEUTIC EXERCISES: CPT | Performed by: PHYSICAL THERAPIST

## 2020-06-09 PROCEDURE — 97162 PT EVAL MOD COMPLEX 30 MIN: CPT | Performed by: PHYSICAL THERAPIST

## 2020-06-09 PROCEDURE — 97140 MANUAL THERAPY 1/> REGIONS: CPT | Performed by: PHYSICAL THERAPIST

## 2020-06-09 NOTE — PROGRESS NOTES
Physical Therapy Initial Evaluation and Plan of Care    Patient: Alem Arzate   : 1965  Diagnosis/ICD-10 Code:  Acute pain of right shoulder [M25.511]  Referring practitioner: Chad Uribe MD  Date of Initial Visit: 2020  Today's Date: 2020  Patient seen for 1 sessions           Subjective Questionnaire: QuickDASH: 39%; Work subscale: 45%, Sport subscale: 70%      Subjective Evaluation    History of Present Illness  Mechanism of injury: Patient presents to physical therapy with chief complaint of right shoulder pain that began about two weeks ago.  States that symptoms began with insidious onset.  Reports that she was having similar symptoms last fall, however that pain went away.  Reports that she had injection in February when s/s flared up and reports relief.  Patient states that over the past two weeks her pain has been so limiting that she was not able to lift arm off her side.  Denies N&T in BUE.  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  Patient wishes to have decreased pain and limitation in RUE.     Pain  Current pain ratin  At worst pain ratin  Quality: dull ache and knife-like  Relieving factors: ice  Aggravating factors: overhead activity, outstretched reach, sleeping and lifting  Progression: no change    Treatments  Previous treatment: injection treatment  Patient Goals  Patient goals for therapy: decreased pain, increased motion and increased strength             Objective          Active Range of Motion   Left Shoulder   Normal active range of motion  Flexion: 160 degrees   Extension: 60 degrees   Abduction: 150 degrees   External rotation BTH: T4   Internal rotation BTB: T7     Right Shoulder   Flexion: 115 degrees with pain  Extension: 50 degrees with pain  Abduction: 70 degrees with pain  External rotation BTH: C2 with pain  Internal rotation BTB: sacrum with pain    Strength/Myotome Testing      Left Shoulder     Planes of Motion   Flexion: 5   Extension: 5   Abduction: 5   External rotation at 0°: 5   Internal rotation at 0°: 5     Isolated Muscles   Biceps: 5   Triceps: 5     Right Shoulder     Planes of Motion   Flexion: 4+   Extension: 5   Abduction: 3+   External rotation at 0°: 4   Internal rotation at 0°: 4+     Isolated Muscles   Biceps: 5   Supraspinatus: 3+   Triceps: 5     Left Wrist/Hand   Wrist extension: 5  Wrist flexion: 5    Right Wrist/Hand   Wrist extension: 5  Wrist flexion: 5    Additional Strength Details  Pain limiting all strength and range of motion in right UE.   strength (L): 66 lbs   strength (R):  64 lbs    Tests     Right Shoulder   Positive drop arm and painful arc.     Additional Tests Details  Painful arc (120 degrees to 90 degrees), test discontinued due to pain          Assessment & Plan     Assessment  Impairments: abnormal or restricted ROM, impaired physical strength, lacks appropriate home exercise program and pain with function  Assessment details: Patient presents to physical therapy with chief complaint of right shoulder pain.  Patient demonstrates restricted AROM/PROM in right shoulder, weakness in RUE, and elevated pain level with AROM/PROM/RROM.  Patient would benefit from skilled physical therapy intervention in order to address these deficits so that she may complete ADL,work and recreational activities with less pain and limitation.  Prognosis: good  Functional Limitations: carrying objects, lifting, sleeping, pulling, pushing, reaching behind back and reaching overhead  Goals  Plan Goals: In two weeks, patient will report at least 25% reduction in pain level.    In two weeks, patient will demonstrate at least 50% improvement in AROM in right shoulder.     In four weeks, patient will demonstrate at least 4+/5 muscular strength in R shoulder.   In four weeks, patient will demonstrate full AROM in right shoulder without pain.   In four weeks, patient  will demonstrate proper technique with HEP.   In four weeks, patient will demonstrate decreased perceived disability by decreasing score on QuickDASH by at least 12%.     Plan  Therapy options: will be seen for skilled physical therapy services  Planned modality interventions: cryotherapy, TENS and thermotherapy (hydrocollator packs)  Planned therapy interventions: manual therapy, neuromuscular re-education, soft tissue mobilization, strengthening, stretching, therapeutic activities, joint mobilization, home exercise program and functional ROM exercises  Frequency: 2x week  Duration in visits: 16        Manual Therapy:    10     mins  88441;  Therapeutic Exercise:    15     mins  24681;     Neuromuscular Justine:        mins  61702;    Therapeutic Activity:          mins  10553;     Gait Training:           mins  07129;     Ultrasound:          mins  58074;    Electrical Stimulation:         mins  25389 ( );  Dry Needling          mins self-pay    Timed Treatment:   25   mins   Total Treatment:     55   mins    PT SIGNATURE: [facility] PT   DATE TREATMENT INITIATED: 6/9/2020    Initial Certification  Certification Period: 9/7/2020  I certify that the therapy services are furnished while this patient is under my care.  The services outlined above are required by this patient, and will be reviewed every 90 days.     PHYSICIAN: Chad Uribe MD      DATE:     Please sign and return via fax to [phone].. Thank you, [facility].

## 2020-06-11 ENCOUNTER — TREATMENT (OUTPATIENT)
Dept: PHYSICAL THERAPY | Facility: CLINIC | Age: 55
End: 2020-06-11

## 2020-06-11 DIAGNOSIS — M25.511 ACUTE PAIN OF RIGHT SHOULDER: Primary | ICD-10-CM

## 2020-06-11 PROCEDURE — 97140 MANUAL THERAPY 1/> REGIONS: CPT | Performed by: PHYSICAL THERAPIST

## 2020-06-11 PROCEDURE — 97110 THERAPEUTIC EXERCISES: CPT | Performed by: PHYSICAL THERAPIST

## 2020-06-11 NOTE — PROGRESS NOTES
Physical Therapy Daily Progress Note    Patient: Alem Arzate   : 1965  Diagnosis/ICD-10 Code:  Acute pain of right shoulder [M25.511]  Referring practitioner: No ref. provider found  Date of Initial Visit: Type: THERAPY  Noted: 2020  Today's Date: 2020  Patient seen for 2 sessions         Alem Arzate reports:  Right shoulder continues to be painful when lifting arm from side.     Objective   See Exercise, Manual, and Modality Logs for complete treatment.       Assessment/Plan     Tolerates IASTM to shoulder and STM to scapula well this visit.  Demonstrates proper technique on exercise.  Continued soreness reported at end of treatment.     Progress per Plan of Care           Manual Therapy:    15     mins  76264;  Therapeutic Exercise:    15     mins  49595;     Neuromuscular Justine:        mins  75183;    Therapeutic Activity:          mins  97738;     Gait Training:           mins  86149;     Ultrasound:          mins  19729;    Electrical Stimulation:         mins  54386 ( );  Dry Needling          mins self-pay    Timed Treatment:   30   mins   Total Treatment:     30   mins    Iglesia Peraza PT  Physical Therapist

## 2020-06-15 ENCOUNTER — TREATMENT (OUTPATIENT)
Dept: PHYSICAL THERAPY | Facility: CLINIC | Age: 55
End: 2020-06-15

## 2020-06-15 DIAGNOSIS — M25.511 ACUTE PAIN OF RIGHT SHOULDER: Primary | ICD-10-CM

## 2020-06-15 NOTE — PROGRESS NOTES
Physical Therapy Daily Progress Note    Patient: Alem Arzate   : 1965  Diagnosis/ICD-10 Code:  Acute pain of right shoulder [M25.511]  Referring practitioner: Chad Uribe MD  Date of Initial Visit: Type: THERAPY  Noted: 2020  Today's Date: 6/15/2020  Patient seen for 3 sessions         Alem Arzate reports:  Continued pain in right shoulder.  Patient reports that shoulder feeling the same as last week.     Objective   See Exercise, Manual, and Modality Logs for complete treatment.       Assessment/Plan    Tolerates dry needling treatment well.  Will assess for progress at next visit.    Progress per Plan of Care           Manual Therapy:         mins  31076;  Therapeutic Exercise:         mins  02803;     Neuromuscular Justine:        mins  75290;    Therapeutic Activity:          mins  30217;     Gait Training:           mins  30147;     Ultrasound:          mins  98182;    Electrical Stimulation:         mins  52000 ( );  Dry Needling     20    mins self-pay    Timed Treatment:   0   mins   Total Treatment:     20   mins    Iglesia Peraza PT  Physical Therapist

## 2020-06-18 ENCOUNTER — TREATMENT (OUTPATIENT)
Dept: PHYSICAL THERAPY | Facility: CLINIC | Age: 55
End: 2020-06-18

## 2020-06-18 DIAGNOSIS — M25.511 ACUTE PAIN OF RIGHT SHOULDER: Primary | ICD-10-CM

## 2020-06-18 PROCEDURE — 97110 THERAPEUTIC EXERCISES: CPT | Performed by: PHYSICAL THERAPIST

## 2020-06-18 PROCEDURE — 97140 MANUAL THERAPY 1/> REGIONS: CPT | Performed by: PHYSICAL THERAPIST

## 2020-06-18 NOTE — PROGRESS NOTES
Physical Therapy Daily Progress Note    Patient: Alem Arzate   : 1965  Diagnosis/ICD-10 Code:  Acute pain of right shoulder [M25.511]  Referring practitioner: Chad Uribe MD  Date of Initial Visit: Type: THERAPY  Noted: 2020  Today's Date: 2020  Patient seen for 4 sessions         Alem Arzate reports: Right shoulder feels slightly less painful after dry needling treatment, however still having pain with tasks such at putting on shirt.     Objective   See Exercise, Manual, and Modality Logs for complete treatment.       Assessment/Plan     Tolerates IASTM well.  Patient demonstrates continued pain with attempted shoulder IR/ER stretching (unable to complete more than 10% of ROM- active or passively).  Patient to follow-up with MD next week due to continued s/s.     Progress per Plan of Care           Manual Therapy:    20     mins  60454;  Therapeutic Exercise:    10     mins  65190;     Neuromuscular Justine:        mins  59249;    Therapeutic Activity:          mins  91880;     Gait Training:           mins  27326;     Ultrasound:          mins  36865;    Electrical Stimulation:         mins  07430 ( );  Dry Needling          mins self-pay    Timed Treatment:   30   mins   Total Treatment:     30   mins    Iglesia Peraza PT  Physical Therapist                      
16-Aug-2017 18:28

## 2020-06-23 ENCOUNTER — TELEPHONE (OUTPATIENT)
Dept: FAMILY MEDICINE CLINIC | Facility: CLINIC | Age: 55
End: 2020-06-23

## 2020-06-23 DIAGNOSIS — M67.911 ROTATOR CUFF DISORDER, RIGHT: Primary | ICD-10-CM

## 2020-06-27 ENCOUNTER — HOSPITAL ENCOUNTER (OUTPATIENT)
Dept: MRI IMAGING | Facility: HOSPITAL | Age: 55
Discharge: HOME | End: 2020-06-27
Admitting: FAMILY MEDICINE

## 2020-06-27 DIAGNOSIS — M67.911 ROTATOR CUFF DISORDER, RIGHT: ICD-10-CM

## 2020-06-27 PROCEDURE — 73221 MRI JOINT UPR EXTREM W/O DYE: CPT

## 2020-06-29 ENCOUNTER — APPOINTMENT (OUTPATIENT)
Dept: MRI IMAGING | Facility: HOSPITAL | Age: 55
End: 2020-06-29

## 2020-06-30 DIAGNOSIS — M75.111 INCOMPLETE TEAR OF RIGHT ROTATOR CUFF, UNSPECIFIED WHETHER TRAUMATIC: Primary | ICD-10-CM

## 2020-07-02 ENCOUNTER — TELEPHONE (OUTPATIENT)
Dept: PHYSICAL THERAPY | Facility: CLINIC | Age: 55
End: 2020-07-02

## 2020-07-02 NOTE — TELEPHONE ENCOUNTER
ORESTES CORCORAN CALLED AND LEFT A VOICEMAIL AND WOULD LIKE YOU TO CALL HER IN REGARDS TO HER PREVIOUS EPISODE OF CARE OF HER RT SHOULDER. PLEASE CALL HER WHEN YOU GET A CHANCE.

## 2020-07-06 ENCOUNTER — OFFICE VISIT (OUTPATIENT)
Dept: ORTHOPEDIC SURGERY | Facility: CLINIC | Age: 55
End: 2020-07-06

## 2020-07-06 VITALS
TEMPERATURE: 97.5 F | HEART RATE: 75 BPM | WEIGHT: 191 LBS | DIASTOLIC BLOOD PRESSURE: 80 MMHG | HEIGHT: 66 IN | RESPIRATION RATE: 16 BRPM | BODY MASS INDEX: 30.7 KG/M2 | OXYGEN SATURATION: 97 % | SYSTOLIC BLOOD PRESSURE: 126 MMHG

## 2020-07-06 DIAGNOSIS — M75.111 PARTIAL NONTRAUMATIC TEAR OF RIGHT ROTATOR CUFF: ICD-10-CM

## 2020-07-06 DIAGNOSIS — M25.511 ACUTE PAIN OF RIGHT SHOULDER: Primary | ICD-10-CM

## 2020-07-06 PROCEDURE — 99243 OFF/OP CNSLTJ NEW/EST LOW 30: CPT | Performed by: ORTHOPAEDIC SURGERY

## 2020-07-06 NOTE — PROGRESS NOTES
"     Patient ID: Alem Arzate is a 55 y.o. female.    Chief Complaint:    Chief Complaint   Patient presents with   • Right Shoulder - Initial Evaluation       HPI:  Alem is a 55-year-old female here in consultation from Dr. Uribe for about 6 to 8 weeks of right shoulder pain that began after lifting some items.  She is developed significant pain in the impingement area worse with overhead activity and at night.  She has had physical therapy and a cortisone injection with minimal relief.  Pain is sharp and a 7/10 and better with rest  Past Medical History:   Diagnosis Date   • Acute meniscal tear of left knee    • Arthritis        Past Surgical History:   Procedure Laterality Date   • BLADDER REPAIR  2001   • COLONOSCOPY N/A 1/6/2020    Procedure: COLONOSCOPY WITH POLYPECTOMY X2;  Surgeon: Miah Toribio MD;  Location: [facility] ENDOSCOPY;  Service: Gastroenterology   • KNEE ARTHROSCOPY Left 1/25/2017    Procedure: LEFT KNEE ARTHROSCOPY WITH CHONDROPLASTY  AND  DRILLING;  Surgeon: Colt Fournier MD;  [place] [facility] [place] [facility] [place]  Service:    • KNEE MENISCAL REPAIR Right    • TOOTH EXTRACTION         Family History   Problem Relation Age of Onset   • Diabetes Mother    • Lupus Mother    • COPD Father    • Lung cancer Father           Social History     Occupational History   • Not on file   Tobacco Use   • Smoking status: Never Smoker   • Smokeless tobacco: Never Used   Substance and Sexual Activity   • Alcohol use: Yes     Alcohol/week: 4.0 standard drinks     Types: 2 Cans of beer, 2 Shots of liquor per week     Frequency: 2-4 times a month     Drinks per session: 3 or 4     Binge frequency: Less than monthly   • Drug use: No   • Sexual activity: Defer      Review of Systems   Cardiovascular: Negative for chest pain.   Musculoskeletal: Positive for arthralgias.       Objective:    /80 (BP Location: Left arm, Patient Position: Sitting)   Pulse 75   Temp 97.5 °F (36.4 °C)   Resp 16   Ht 167.6 cm (66\")   " Wt 86.6 kg (191 lb)   SpO2 97%   BMI 30.83 kg/m²     Physical Examination:  She is a pleasant female in no distress. She is alert and oriented x3 and appears her stated age.  Right shoulder demonstrates no scars and no atrophy.  She has tenderness in the impingement area and bicep groove.  Passive elevation 160 degrees abduction 130 degrees external rotation 30 degrees internal rotation is S1.  She has pain weakness on Speed, Richardson, supraspinatus testing.  Belly press and liftoff are 5/5 and 4/5.Sensory and motor exam are intact all distributions. Radial pulse is palpable and capillary refill is less than two seconds to all digits    Imaging:  MRI demonstrates high-grade near full-thickness tear of the supraspinatus and partial subscapularis tear with bicep tendinitis    Assessment:  Right shoulder high-grade partial cuff tear and bicep tendinitis    Plan:  Treatment options discussed.  In light of her conservative treatment and high-grade tear further options would be continued therapy possible PRP injection versus shoulder arthroscopy with cuff repair and possible bicep tenodesis.  She would like to think about her options and let me know.Risks and benefits, specifically risks of bleeding, scar, infection, fracture, stiffness, retear, nerve, tendon, artery damage, the need for further surgery, DVT, and loss of life or limb and rehab were discussed. All questions were answered and addressed.          Disclaimer: Please note that areas of this note were completed with computer voice recognition software.  Quite often unanticipated grammatical, syntax, homophones, and other interpretive errors are inadvertently transcribed by the computer software. Please excuse any errors that have escaped final proofreading.

## 2020-07-07 ENCOUNTER — PREP FOR SURGERY (OUTPATIENT)
Dept: OTHER | Facility: HOSPITAL | Age: 55
End: 2020-07-07

## 2020-07-07 DIAGNOSIS — M25.511 ACUTE PAIN OF RIGHT SHOULDER: Primary | ICD-10-CM

## 2020-07-07 DIAGNOSIS — M75.111 PARTIAL NONTRAUMATIC TEAR OF RIGHT ROTATOR CUFF: Primary | ICD-10-CM

## 2020-07-08 PROBLEM — M75.111 PARTIAL NONTRAUMATIC TEAR OF RIGHT ROTATOR CUFF: Status: ACTIVE | Noted: 2020-07-08

## 2020-07-16 RX ORDER — IBUPROFEN 200 MG
600 TABLET ORAL EVERY 6 HOURS PRN
COMMUNITY
End: 2020-07-24 | Stop reason: HOSPADM

## 2020-07-22 ENCOUNTER — LAB (OUTPATIENT)
Dept: LAB | Facility: HOSPITAL | Age: 55
End: 2020-07-22

## 2020-07-22 ENCOUNTER — HOSPITAL ENCOUNTER (OUTPATIENT)
Dept: CARDIOLOGY | Facility: HOSPITAL | Age: 55
Discharge: HOME | End: 2020-07-22
Admitting: ORTHOPAEDIC SURGERY

## 2020-07-22 DIAGNOSIS — M75.111 PARTIAL NONTRAUMATIC TEAR OF RIGHT ROTATOR CUFF: ICD-10-CM

## 2020-07-22 LAB
ANION GAP SERPL CALCULATED.3IONS-SCNC: 12.9 MMOL/L (ref 5–15)
APTT PPP: 24 SECONDS (ref 24–31)
BASOPHILS # BLD AUTO: 0.05 10*3/MM3 (ref 0–0.2)
BASOPHILS NFR BLD AUTO: 0.6 % (ref 0–1.5)
BUN SERPL-MCNC: 11 MG/DL (ref 6–20)
BUN/CREAT SERPL: 12.2 (ref 7–25)
CALCIUM SPEC-SCNC: 9.7 MG/DL (ref 8.6–10.5)
CHLORIDE SERPL-SCNC: 101 MMOL/L (ref 98–107)
CO2 SERPL-SCNC: 25.1 MMOL/L (ref 22–29)
CREAT SERPL-MCNC: 0.9 MG/DL (ref 0.57–1)
DEPRECATED RDW RBC AUTO: 44.8 FL (ref 37–54)
EOSINOPHIL # BLD AUTO: 0.1 10*3/MM3 (ref 0–0.4)
EOSINOPHIL NFR BLD AUTO: 1.2 % (ref 0.3–6.2)
ERYTHROCYTE [DISTWIDTH] IN BLOOD BY AUTOMATED COUNT: 13.2 % (ref 12.3–15.4)
GFR SERPL CREATININE-BSD FRML MDRD: 65 ML/MIN/1.73
GLUCOSE SERPL-MCNC: 117 MG/DL (ref 65–99)
HCT VFR BLD AUTO: 42.8 % (ref 34–46.6)
HGB BLD-MCNC: 14.7 G/DL (ref 12–15.9)
IMM GRANULOCYTES # BLD AUTO: 0.04 10*3/MM3 (ref 0–0.05)
IMM GRANULOCYTES NFR BLD AUTO: 0.5 % (ref 0–0.5)
INR PPP: 1.04 (ref 0.9–1.1)
LYMPHOCYTES # BLD AUTO: 2.91 10*3/MM3 (ref 0.7–3.1)
LYMPHOCYTES NFR BLD AUTO: 36.1 % (ref 19.6–45.3)
MCH RBC QN AUTO: 31.6 PG (ref 26.6–33)
MCHC RBC AUTO-ENTMCNC: 34.3 G/DL (ref 31.5–35.7)
MCV RBC AUTO: 92 FL (ref 79–97)
MONOCYTES # BLD AUTO: 0.74 10*3/MM3 (ref 0.1–0.9)
MONOCYTES NFR BLD AUTO: 9.2 % (ref 5–12)
NEUTROPHILS NFR BLD AUTO: 4.23 10*3/MM3 (ref 1.7–7)
NEUTROPHILS NFR BLD AUTO: 52.4 % (ref 42.7–76)
NRBC BLD AUTO-RTO: 0 /100 WBC (ref 0–0.2)
PLATELET # BLD AUTO: 234 10*3/MM3 (ref 140–450)
PMV BLD AUTO: 10.2 FL (ref 6–12)
POTASSIUM SERPL-SCNC: 4.1 MMOL/L (ref 3.5–5.2)
PROTHROMBIN TIME: 10.8 SECONDS (ref 9.6–11.7)
RBC # BLD AUTO: 4.65 10*6/MM3 (ref 3.77–5.28)
SODIUM SERPL-SCNC: 139 MMOL/L (ref 136–145)
WBC # BLD AUTO: 8.07 10*3/MM3 (ref 3.4–10.8)

## 2020-07-22 PROCEDURE — 85025 COMPLETE CBC W/AUTO DIFF WBC: CPT

## 2020-07-22 PROCEDURE — 85730 THROMBOPLASTIN TIME PARTIAL: CPT

## 2020-07-22 PROCEDURE — C9803 HOPD COVID-19 SPEC COLLECT: HCPCS

## 2020-07-22 PROCEDURE — U0004 COV-19 TEST NON-CDC HGH THRU: HCPCS

## 2020-07-22 PROCEDURE — 93005 ELECTROCARDIOGRAM TRACING: CPT | Performed by: ORTHOPAEDIC SURGERY

## 2020-07-22 PROCEDURE — 85610 PROTHROMBIN TIME: CPT

## 2020-07-22 PROCEDURE — 36415 COLL VENOUS BLD VENIPUNCTURE: CPT

## 2020-07-22 PROCEDURE — U0002 COVID-19 LAB TEST NON-CDC: HCPCS

## 2020-07-22 PROCEDURE — 80048 BASIC METABOLIC PNL TOTAL CA: CPT

## 2020-07-23 ENCOUNTER — ANESTHESIA EVENT (OUTPATIENT)
Dept: PERIOP | Facility: HOSPITAL | Age: 55
End: 2020-07-23

## 2020-07-23 DIAGNOSIS — M75.111 PARTIAL NONTRAUMATIC TEAR OF RIGHT ROTATOR CUFF: Primary | ICD-10-CM

## 2020-07-23 LAB
REF LAB TEST METHOD: NORMAL
SARS-COV-2 RNA RESP QL NAA+PROBE: NOT DETECTED

## 2020-07-23 RX ORDER — CEPHALEXIN 500 MG/1
500 CAPSULE ORAL 4 TIMES DAILY
Qty: 4 CAPSULE | Refills: 0 | Status: SHIPPED | OUTPATIENT
Start: 2020-07-23 | End: 2020-07-24

## 2020-07-23 RX ORDER — OXYCODONE AND ACETAMINOPHEN 10; 325 MG/1; MG/1
1 TABLET ORAL EVERY 6 HOURS PRN
Qty: 28 TABLET | Refills: 0 | Status: SHIPPED | OUTPATIENT
Start: 2020-07-23 | End: 2020-08-24

## 2020-07-23 RX ORDER — NAPROXEN 500 MG/1
500 TABLET ORAL 2 TIMES DAILY WITH MEALS
Qty: 28 TABLET | Refills: 0 | Status: SHIPPED | OUTPATIENT
Start: 2020-07-23 | End: 2020-08-24

## 2020-07-23 RX ORDER — PROMETHAZINE HYDROCHLORIDE 25 MG/1
25 TABLET ORAL EVERY 6 HOURS PRN
Qty: 21 TABLET | Refills: 1 | Status: SHIPPED | OUTPATIENT
Start: 2020-07-23 | End: 2020-08-24

## 2020-07-24 ENCOUNTER — HOSPITAL ENCOUNTER (OUTPATIENT)
Facility: HOSPITAL | Age: 55
Setting detail: HOSPITAL OUTPATIENT SURGERY
Discharge: HOME | End: 2020-07-24
Attending: ORTHOPAEDIC SURGERY | Admitting: ORTHOPAEDIC SURGERY

## 2020-07-24 ENCOUNTER — ANESTHESIA (OUTPATIENT)
Dept: PERIOP | Facility: HOSPITAL | Age: 55
End: 2020-07-24

## 2020-07-24 VITALS
WEIGHT: 190 LBS | RESPIRATION RATE: 16 BRPM | OXYGEN SATURATION: 98 % | TEMPERATURE: 97.1 F | HEART RATE: 65 BPM | SYSTOLIC BLOOD PRESSURE: 142 MMHG | DIASTOLIC BLOOD PRESSURE: 74 MMHG | HEIGHT: 66 IN | BODY MASS INDEX: 30.53 KG/M2

## 2020-07-24 DIAGNOSIS — M75.111 PARTIAL NONTRAUMATIC TEAR OF RIGHT ROTATOR CUFF: ICD-10-CM

## 2020-07-24 PROCEDURE — 25010000002 PROPOFOL 10 MG/ML EMULSION: Performed by: NURSE ANESTHETIST, CERTIFIED REGISTERED

## 2020-07-24 PROCEDURE — 25010000002 ONDANSETRON PER 1 MG: Performed by: NURSE ANESTHETIST, CERTIFIED REGISTERED

## 2020-07-24 PROCEDURE — 25010000002 MIDAZOLAM PER 1 MG: Performed by: ANESTHESIOLOGY

## 2020-07-24 PROCEDURE — 25010000002 KETOROLAC TROMETHAMINE PER 15 MG: Performed by: ORTHOPAEDIC SURGERY

## 2020-07-24 PROCEDURE — 25010000002 EPINEPHRINE 1 MG/10ML SOLUTION PREFILLED SYRINGE: Performed by: ORTHOPAEDIC SURGERY

## 2020-07-24 PROCEDURE — 25010000002 DEXAMETHASONE PER 1 MG: Performed by: NURSE ANESTHETIST, CERTIFIED REGISTERED

## 2020-07-24 PROCEDURE — 25010000003 BUPIVACAINE LIPOSOME 1.3 % SUSPENSION: Performed by: ANESTHESIOLOGY

## 2020-07-24 PROCEDURE — 25010000003 LIDOCAINE 1 % SOLUTION 20 ML VIAL: Performed by: ORTHOPAEDIC SURGERY

## 2020-07-24 PROCEDURE — C1713 ANCHOR/SCREW BN/BN,TIS/BN: HCPCS | Performed by: ORTHOPAEDIC SURGERY

## 2020-07-24 PROCEDURE — 25010000002 CEFAZOLIN PER 500 MG: Performed by: ORTHOPAEDIC SURGERY

## 2020-07-24 PROCEDURE — C9290 INJ, BUPIVACAINE LIPOSOME: HCPCS | Performed by: ANESTHESIOLOGY

## 2020-07-24 PROCEDURE — 29827 SHO ARTHRS SRG RT8TR CUF RPR: CPT | Performed by: ORTHOPAEDIC SURGERY

## 2020-07-24 PROCEDURE — 25010000002 KETOROLAC TROMETHAMINE PER 15 MG: Performed by: NURSE ANESTHETIST, CERTIFIED REGISTERED

## 2020-07-24 PROCEDURE — 25010000003 LIDOCAINE 1 % SOLUTION 50 ML VIAL: Performed by: ORTHOPAEDIC SURGERY

## 2020-07-24 PROCEDURE — 29826 SHO ARTHRS SRG DECOMPRESSION: CPT | Performed by: ORTHOPAEDIC SURGERY

## 2020-07-24 DEVICE — CROSSFT KNOTLESS BIOCOMPOSITE 4.75 MM SUTURE ANCHOR WITH ONE 2 MM HI-FI TAPE (BLUE)
Type: IMPLANTABLE DEVICE | Site: SHOULDER | Status: FUNCTIONAL
Brand: CROSSFT, HI-FI

## 2020-07-24 RX ORDER — KETOROLAC TROMETHAMINE 30 MG/ML
INJECTION, SOLUTION INTRAMUSCULAR; INTRAVENOUS AS NEEDED
Status: DISCONTINUED | OUTPATIENT
Start: 2020-07-24 | End: 2020-07-24 | Stop reason: SURG

## 2020-07-24 RX ORDER — KETAMINE HYDROCHLORIDE 10 MG/ML
INJECTION INTRAMUSCULAR; INTRAVENOUS AS NEEDED
Status: DISCONTINUED | OUTPATIENT
Start: 2020-07-24 | End: 2020-07-24 | Stop reason: SURG

## 2020-07-24 RX ORDER — NEOSTIGMINE METHYLSULFATE 5 MG/5 ML
SYRINGE (ML) INTRAVENOUS AS NEEDED
Status: DISCONTINUED | OUTPATIENT
Start: 2020-07-24 | End: 2020-07-24 | Stop reason: SURG

## 2020-07-24 RX ORDER — SODIUM CHLORIDE 0.9 % (FLUSH) 0.9 %
10 SYRINGE (ML) INJECTION EVERY 12 HOURS SCHEDULED
Status: DISCONTINUED | OUTPATIENT
Start: 2020-07-24 | End: 2020-07-24 | Stop reason: HOSPADM

## 2020-07-24 RX ORDER — ACETAMINOPHEN 650 MG/1
650 SUPPOSITORY RECTAL ONCE AS NEEDED
Status: DISCONTINUED | OUTPATIENT
Start: 2020-07-24 | End: 2020-07-24 | Stop reason: HOSPADM

## 2020-07-24 RX ORDER — PROPOFOL 10 MG/ML
VIAL (ML) INTRAVENOUS AS NEEDED
Status: DISCONTINUED | OUTPATIENT
Start: 2020-07-24 | End: 2020-07-24 | Stop reason: SURG

## 2020-07-24 RX ORDER — PHENYLEPHRINE HCL IN 0.9% NACL 0.5 MG/5ML
SYRINGE (ML) INTRAVENOUS AS NEEDED
Status: DISCONTINUED | OUTPATIENT
Start: 2020-07-24 | End: 2020-07-24 | Stop reason: SURG

## 2020-07-24 RX ORDER — PROMETHAZINE HYDROCHLORIDE 25 MG/ML
6.25 INJECTION, SOLUTION INTRAMUSCULAR; INTRAVENOUS ONCE AS NEEDED
Status: DISCONTINUED | OUTPATIENT
Start: 2020-07-24 | End: 2020-07-24 | Stop reason: HOSPADM

## 2020-07-24 RX ORDER — DEXAMETHASONE SODIUM PHOSPHATE 4 MG/ML
INJECTION, SOLUTION INTRA-ARTICULAR; INTRALESIONAL; INTRAMUSCULAR; INTRAVENOUS; SOFT TISSUE AS NEEDED
Status: DISCONTINUED | OUTPATIENT
Start: 2020-07-24 | End: 2020-07-24 | Stop reason: SURG

## 2020-07-24 RX ORDER — SCOLOPAMINE TRANSDERMAL SYSTEM 1 MG/1
PATCH, EXTENDED RELEASE TRANSDERMAL AS NEEDED
Status: DISCONTINUED | OUTPATIENT
Start: 2020-07-24 | End: 2020-07-24 | Stop reason: SURG

## 2020-07-24 RX ORDER — FENTANYL CITRATE 50 UG/ML
25 INJECTION, SOLUTION INTRAMUSCULAR; INTRAVENOUS
Status: DISCONTINUED | OUTPATIENT
Start: 2020-07-24 | End: 2020-07-24 | Stop reason: HOSPADM

## 2020-07-24 RX ORDER — IBUPROFEN 400 MG/1
600 TABLET ORAL ONCE AS NEEDED
Status: DISCONTINUED | OUTPATIENT
Start: 2020-07-24 | End: 2020-07-24 | Stop reason: HOSPADM

## 2020-07-24 RX ORDER — HYDROMORPHONE HCL 110MG/55ML
0.25 PATIENT CONTROLLED ANALGESIA SYRINGE INTRAVENOUS
Status: DISCONTINUED | OUTPATIENT
Start: 2020-07-24 | End: 2020-07-24 | Stop reason: HOSPADM

## 2020-07-24 RX ORDER — LIDOCAINE HYDROCHLORIDE 10 MG/ML
INJECTION, SOLUTION EPIDURAL; INFILTRATION; INTRACAUDAL; PERINEURAL AS NEEDED
Status: DISCONTINUED | OUTPATIENT
Start: 2020-07-24 | End: 2020-07-24 | Stop reason: SURG

## 2020-07-24 RX ORDER — ONDANSETRON 2 MG/ML
4 INJECTION INTRAMUSCULAR; INTRAVENOUS ONCE AS NEEDED
Status: DISCONTINUED | OUTPATIENT
Start: 2020-07-24 | End: 2020-07-24 | Stop reason: HOSPADM

## 2020-07-24 RX ORDER — ROCURONIUM BROMIDE 10 MG/ML
INJECTION, SOLUTION INTRAVENOUS AS NEEDED
Status: DISCONTINUED | OUTPATIENT
Start: 2020-07-24 | End: 2020-07-24 | Stop reason: SURG

## 2020-07-24 RX ORDER — PROMETHAZINE HYDROCHLORIDE 25 MG/1
25 SUPPOSITORY RECTAL ONCE AS NEEDED
Status: DISCONTINUED | OUTPATIENT
Start: 2020-07-24 | End: 2020-07-24 | Stop reason: HOSPADM

## 2020-07-24 RX ORDER — GLYCOPYRROLATE 1 MG/5 ML
SYRINGE (ML) INTRAVENOUS AS NEEDED
Status: DISCONTINUED | OUTPATIENT
Start: 2020-07-24 | End: 2020-07-24 | Stop reason: SURG

## 2020-07-24 RX ORDER — ONDANSETRON 2 MG/ML
INJECTION INTRAMUSCULAR; INTRAVENOUS AS NEEDED
Status: DISCONTINUED | OUTPATIENT
Start: 2020-07-24 | End: 2020-07-24 | Stop reason: SURG

## 2020-07-24 RX ORDER — BUPIVACAINE HYDROCHLORIDE 5 MG/ML
INJECTION, SOLUTION EPIDURAL; INTRACAUDAL
Status: COMPLETED | OUTPATIENT
Start: 2020-07-24 | End: 2020-07-24

## 2020-07-24 RX ORDER — SODIUM CHLORIDE, SODIUM LACTATE, POTASSIUM CHLORIDE, CALCIUM CHLORIDE 600; 310; 30; 20 MG/100ML; MG/100ML; MG/100ML; MG/100ML
9 INJECTION, SOLUTION INTRAVENOUS CONTINUOUS PRN
Status: DISCONTINUED | OUTPATIENT
Start: 2020-07-24 | End: 2020-07-24 | Stop reason: HOSPADM

## 2020-07-24 RX ORDER — MIDAZOLAM HYDROCHLORIDE 1 MG/ML
INJECTION INTRAMUSCULAR; INTRAVENOUS AS NEEDED
Status: DISCONTINUED | OUTPATIENT
Start: 2020-07-24 | End: 2020-07-24 | Stop reason: SURG

## 2020-07-24 RX ORDER — EPINEPHRINE 0.1 MG/ML
SYRINGE (ML) INJECTION AS NEEDED
Status: DISCONTINUED | OUTPATIENT
Start: 2020-07-24 | End: 2020-07-24 | Stop reason: HOSPADM

## 2020-07-24 RX ORDER — PROMETHAZINE HYDROCHLORIDE 25 MG/1
25 TABLET ORAL ONCE AS NEEDED
Status: DISCONTINUED | OUTPATIENT
Start: 2020-07-24 | End: 2020-07-24 | Stop reason: HOSPADM

## 2020-07-24 RX ORDER — SODIUM CHLORIDE 0.9 % (FLUSH) 0.9 %
10 SYRINGE (ML) INJECTION AS NEEDED
Status: DISCONTINUED | OUTPATIENT
Start: 2020-07-24 | End: 2020-07-24 | Stop reason: HOSPADM

## 2020-07-24 RX ORDER — ACETAMINOPHEN 325 MG/1
650 TABLET ORAL ONCE AS NEEDED
Status: DISCONTINUED | OUTPATIENT
Start: 2020-07-24 | End: 2020-07-24 | Stop reason: HOSPADM

## 2020-07-24 RX ADMIN — KETOROLAC TROMETHAMINE 30 MG: 30 INJECTION, SOLUTION INTRAMUSCULAR at 08:25

## 2020-07-24 RX ADMIN — SODIUM CHLORIDE, SODIUM LACTATE, POTASSIUM CHLORIDE, AND CALCIUM CHLORIDE: .6; .31; .03; .02 INJECTION, SOLUTION INTRAVENOUS at 07:20

## 2020-07-24 RX ADMIN — BUPIVACAINE 10 ML: 13.3 INJECTION, SUSPENSION, LIPOSOMAL INFILTRATION at 07:14

## 2020-07-24 RX ADMIN — PHENYLEPHRINE HYDROCHLORIDE 200 MCG: 10 INJECTION INTRAVENOUS at 08:14

## 2020-07-24 RX ADMIN — BUPIVACAINE HYDROCHLORIDE 10 ML: 5 INJECTION, SOLUTION EPIDURAL; INTRACAUDAL; PERINEURAL at 07:14

## 2020-07-24 RX ADMIN — KETAMINE HYDROCHLORIDE 25 MG: 10 INJECTION INTRAMUSCULAR; INTRAVENOUS at 07:24

## 2020-07-24 RX ADMIN — Medication 3 MG: at 08:25

## 2020-07-24 RX ADMIN — ONDANSETRON 4 MG: 2 INJECTION INTRAMUSCULAR; INTRAVENOUS at 08:25

## 2020-07-24 RX ADMIN — SODIUM CHLORIDE, SODIUM LACTATE, POTASSIUM CHLORIDE, AND CALCIUM CHLORIDE: .6; .31; .03; .02 INJECTION, SOLUTION INTRAVENOUS at 08:26

## 2020-07-24 RX ADMIN — PHENYLEPHRINE HYDROCHLORIDE 100 MCG: 10 INJECTION INTRAVENOUS at 07:57

## 2020-07-24 RX ADMIN — PHENYLEPHRINE HYDROCHLORIDE 200 MCG: 10 INJECTION INTRAVENOUS at 08:06

## 2020-07-24 RX ADMIN — Medication 0.4 MG: at 08:25

## 2020-07-24 RX ADMIN — KETAMINE HYDROCHLORIDE 25 MG: 10 INJECTION INTRAMUSCULAR; INTRAVENOUS at 08:04

## 2020-07-24 RX ADMIN — LIDOCAINE HYDROCHLORIDE 100 MG: 10 INJECTION, SOLUTION EPIDURAL; INFILTRATION; INTRACAUDAL; PERINEURAL at 07:23

## 2020-07-24 RX ADMIN — MIDAZOLAM 2 MG: 1 INJECTION INTRAMUSCULAR; INTRAVENOUS at 07:10

## 2020-07-24 RX ADMIN — ROCURONIUM BROMIDE 35 MG: 10 INJECTION, SOLUTION INTRAVENOUS at 07:23

## 2020-07-24 RX ADMIN — SCOPALAMINE 1 PATCH: 1 PATCH, EXTENDED RELEASE TRANSDERMAL at 07:45

## 2020-07-24 RX ADMIN — DEXAMETHASONE SODIUM PHOSPHATE 12 MG: 4 INJECTION, SOLUTION INTRAMUSCULAR; INTRAVENOUS at 07:28

## 2020-07-24 RX ADMIN — SODIUM CHLORIDE, SODIUM LACTATE, POTASSIUM CHLORIDE, AND CALCIUM CHLORIDE 9 ML/HR: .6; .31; .03; .02 INJECTION, SOLUTION INTRAVENOUS at 06:51

## 2020-07-24 RX ADMIN — PROPOFOL 200 MG: 10 INJECTION, EMULSION INTRAVENOUS at 07:23

## 2020-07-24 RX ADMIN — CEFAZOLIN 2 G: 10 INJECTION, POWDER, FOR SOLUTION INTRAVENOUS at 07:25

## 2020-07-24 RX ADMIN — PHENYLEPHRINE HYDROCHLORIDE 200 MCG: 10 INJECTION INTRAVENOUS at 08:29

## 2020-07-24 NOTE — OP NOTE
SHOULDER ARTHROSCOPY WITH ROTATOR CUFF REPAIR  Procedure Report    Patient Name:  Alem Arzate  YOB: 1965    Date of Surgery:  7/24/2020     Indications: This is a 55 y.o. female with pain to the right shoulder.  Imaging demonstrated rotator cuff tear.Treatment options were discussed.  They desired to proceed with shoulder arthroscopy with rotator cuff repair after discussing the risks including bleeding, scarring, infection, stiffness, nerve damage, tendon damage, artery damage, continued pain, DVT, loss of life or limb, and a need for further surgery.      Pre-op Diagnosis:   Partial nontraumatic tear of right rotator cuff [M75.111]       Post-Op Diagnosis Codes:  Right shoulder adhesive capsulitis, high-grade partial supraspinatus tear with impingement    Procedure/CPT® Codes: 40242 [id number]    Procedure(s):  SHOULDER ARTHROSCOPY WITH ROTATOR CUFF REPAIR, decompression, capsular release      Assistant: Ravi Sanchez certified first assist    was responsible for performing the following activities: Retraction, Suction, Irrigation, Suturing, Closing and Placing Dressing and their skilled assistance was necessary for the success of this case.       No specimen      Anesthesia: General with Block    IV fluids: See anesthesia record    Estimated Blood Loss: minimal    Implants:    Implant Name Type Inv. Item Serial No.  Lot No. LRB No. Used   2mm hi-fi tape     [id number] Right 1   SUT/ANCH KNOTLSS CROSSFT BIOCOMP ROW MEDL W/TPE LIZETTE 4.75MM - [id number] Implant SUT/ANCH KNOTLSS CROSSFT BIOCOMP ROW MEDL W/TPE LIZETTE 4.75MM  [facility] [id number] Right 1         Complications: None    Description of Procedure: The patient's operative site was marked.  Regional anesthesia was administered.  They were brought to the operating room and placed  on the operating room table.  General anesthesia was administered. Antibiotics were dosed.  A timeout was taken, confirming the correct operative site and  procedure.  Exam under anesthesia indicated 30 degrees loss of forward elevation, 20 degrees loss of abduction, 10 degrees loss of external rotation and 20 degrees loss of internal rotation and no instability.  They were placed in a semilateral position.  An axillary roll and SCDs were placed.  The right shoulder was prepped and draped in the standard surgical fashion.  The shoulder was injected with local anesthetic and was placed into traction.  A posterior portal was created.  A camera was inserted.  The glenoid chondral surface was intact.  The humerus surface was intact.  The subscapularis was intact.  The biceps was intact.  The labrum was intact.  The undersurface of the cuff demonstrated high-grade tearing of the central aspect of the supraspinatus. A shaver was inserted.  The labrum was probed and intact.  The biceps was pulled into the shoulder and found to be intact.  The axillary pouch was free of synovitis or loose bodies.  Rotator interval and anterior capsule were significantly thickened, thermal device was used to perform a capsular release of the rotator interval and anterior capsule.  Manipulation resulted in obtaining full range of motion.  PDS suture tagged the supraspinatus tear. the instruments were placed in the subacromial space.  A full-thickness bursectomy was performed.  The CA ligament was torn anteriorly and resected revealing underlying spur resected with a bur to complete the acromioplasty.  An accessory portal was created. Tear was visualized and prepared.  It was a 1.5 cm crescent shape tear. Tuberosity skeletonized and a microfracture performed.   Fiber tape suture as well as a fiber link suture were used to create a mattress and ripstop configuration. These were secured to a anchor off the lateral edge of the tuberosity restoring the tendon to its footprint.   The instruments were removed.  The wounds were closed with suture and Steri-Strips.    30 mg of Toradol and lidocaine were  injected into the deltoid  and a sterile dressing was applied to the rest of the shoulder.  They were placed in a sling and taken to the recovery room.  There were no complications.  I was present for all portions.  All counts were correct.  Good capillary refill was noted to the hand.      Jamal Hylton MD     Date: 7/24/2020  Time: 08:28

## 2020-07-24 NOTE — ANESTHESIA PROCEDURE NOTES
Airway  Urgency: elective    Date/Time: 7/24/2020 7:44 AM  Difficult airway    General Information and Staff    Patient location during procedure: OR    Indications and Patient Condition  Indications for airway management: airway protection    Preoxygenated: yes  MILS maintained throughout  Mask difficulty assessment: 1 - vent by mask    Final Airway Details  Final airway type: endotracheal airway      Successful airway: ETT  Cuffed: yes   Successful intubation technique: direct laryngoscopy  Facilitating devices/methods: intubating stylet and cricoid pressure  Endotracheal tube insertion site: oral  Blade: Glidescope  Blade size: 3  ETT size (mm): 7.0  Cormack & Lehane grading: Grade iV with inability to pass tube without glidescope. Smooth intubation using glidescope on second attempt.  Placement verified by: chest auscultation and capnometry   Cuff volume (mL): 8  Measured from: teeth  ETT/EBT  to teeth (cm): 21  Number of attempts at approach: 2  Assessment: lips, teeth, and gum same as pre-op and atraumatic intubation

## 2020-07-24 NOTE — ANESTHESIA PREPROCEDURE EVALUATION
Anesthesia Evaluation     Patient summary reviewed and Nursing notes reviewed   history of anesthetic complications: PONV  NPO Solid Status: > 8 hours  NPO Liquid Status: > 8 hours           Airway   Mallampati: III  TM distance: >3 FB  Neck ROM: full  No difficulty expected and Anterior  Dental - normal exam     Pulmonary - negative pulmonary ROS and normal exam   Cardiovascular - normal exam    (+) hyperlipidemia,       Neuro/Psych- negative ROS  GI/Hepatic/Renal/Endo - negative ROS     Musculoskeletal     Abdominal  - normal exam    Bowel sounds: normal.   Substance History - negative use     OB/GYN negative ob/gyn ROS         Other   arthritis,                      Anesthesia Plan    ASA 2     general with block   (Geta ponv prophylaxis  IS block for post op pain)  intravenous induction     Anesthetic plan, all risks, benefits, and alternatives have been provided, discussed and informed consent has been obtained with: patient.    Plan discussed with CRNA.

## 2020-07-24 NOTE — ANESTHESIA POSTPROCEDURE EVALUATION
Patient: Alem Arzate    Procedure Summary     Date:  07/24/20 Room / Location:  [facility] OR [address] [place] [address] MAIN OR    Anesthesia Start:  0720 Anesthesia Stop:  0845    Procedure:  SHOULDER ARTHROSCOPY WITH ROTATOR CUFF REPAIR, decompression, capsular release   (Right Shoulder) Diagnosis:       Partial nontraumatic tear of right rotator cuff      (Partial nontraumatic tear of right rotator cuff [M75.111])    Surgeon:  Jamal Hylton MD Provider:  Susy Stephens MD    Anesthesia Type:  general with block ASA Status:  2          Anesthesia Type: general with block    Vitals  Vitals Value Taken Time   /75 7/24/2020  9:31 AM   Temp 97.6 °F (36.4 °C) 7/24/2020  9:31 AM   Pulse 68 7/24/2020  9:31 AM   Resp 13 7/24/2020  9:31 AM   SpO2 99 % 7/24/2020  9:31 AM           Post Anesthesia Care and Evaluation    Patient location during evaluation: PACU  Patient participation: complete - patient participated  Level of consciousness: awake  Pain score: 0 (See nurse's notes for pain score)  Pain management: adequate  Airway patency: patent  Anesthetic complications: No anesthetic complications  PONV Status: none  Cardiovascular status: acceptable  Respiratory status: acceptable  Hydration status: acceptable    Comments: Patient seen and examined postoperatively; vital signs stable; SpO2 greater than or equal to 90%; cardiopulmonary status stable; nausea/vomiting adequately controlled; pain adequately controlled; no apparent anesthesia complications; patient discharged from anesthesia care when discharge criteria were met

## 2020-07-24 NOTE — ANESTHESIA PROCEDURE NOTES
Peripheral Block    Pre-sedation assessment completed: 7/24/2020 7:00 AM    Patient reassessed immediately prior to procedure    Patient location during procedure: pre-op  Reason for block: procedure for pain, at surgeon's request, post-op pain management and secondary anesthetic  Performed by  Anesthesiologist: Neel Steven MD  Preanesthetic Checklist  Completed: patient identified, site marked, surgical consent, pre-op evaluation, timeout performed, IV checked, risks and benefits discussed and monitors and equipment checked  Prep:  Pt Position: sitting  Sterile barriers:cap and gloves  Prep: ChloraPrep  Patient monitoring: blood pressure monitoring, continuous pulse oximetry and EKG  Procedure  Sedation:yes  Performed under: local infiltration  Guidance:ultrasound guided and landmark technique  ULTRASOUND INTERPRETATION. Using ultrasound guidance a 22 G gauge needle was placed in close proximity to the nerve, at which point, under ultrasound guidance anesthetic was injected in the area of the nerve and spread of the anesthesia was seen on ultrasound in close proximity thereto.  There were no abnormalities seen on ultrasound; a digital image was taken; and the patient tolerated the procedure with no complications. Images:still images obtained, printed/placed on chart    Laterality:right  Block Type:interscalene  Injection Technique:single-shot  Needle Type:echogenic  Needle Gauge:22 G  Resistance on Injection: less than 15 psi    Medications Used: bupivacaine PF (MARCAINE) injection 0.5%, 10 mL  bupivacaine liposome (EXPAREL) injection 1.3%, 10 mL  Med admintered at 7/24/2020 7:14 AM      Post Assessment  Injection Assessment: negative aspiration for heme, no paresthesia on injection and incremental injection  Patient Tolerance:comfortable throughout block  Complications:no  Additional Notes  Pre-procedure:  Peripheral nerve block performed preoperatively prior to the start of anesthesia time at the  request of the patient and the surgeon for the management of postoperative acute surgical pain as well as for a secondary intraoperative anesthetic (general anesthesia is the primary intraoperative anesthetic); patient identified; pre-procedure vital signs, all relevant labs/studies, complete medical/surgical/anesthetic history, full medication list, full allergy list, and NPO status obtained/reviewed; physical assessment performed; anesthetic options, side effects, potential complications, risks, and benefits discussed; questions answered; patient wishes to proceed with the procedure; written anesthesia procedure consent obtained; patient cleared for procedure; time out performed; IV access in situ    Procedure:  ASA monitor placed; supplemental oxygen provided; patient positioned; hand hygiene performed; sterile technique maintained throughout the procedure; sterile prep applied; insertion site determined by anatomical landmarks, palpation, and ultrasound imaging; live ultrasound guidance throughout the procedure; target nerves/landmarks identified on live ultrasound; skin and subcutaneous tissues numbed by injection of 1% lidocaine; 2 inch 22G StimupHolidu Ultra 360 Insulated Echogenic Needle used; realtime needle advancement and placement near the target nerves witnessed on live ultrasound; negative aspiration prior to injection; correct needle placement confirmed on live ultrasound by local anesthetic spread around the target nerves; local anesthetic mixture injected with negative aspiration prior to each injection and after each 1-5 mL injected; needle withdrawn; dressing applied; ultrasound image printed and placed in the patient's permanent medical record    Post-procedure:  Peripheral nerve block placed successfully; good block; no apparent complications; minimal estimated blood loss; vital signs stable throughout; see nurse's notes for vitals; transported to the OR, general anesthesia induced, and surgery  started

## 2020-07-27 ENCOUNTER — OFFICE VISIT (OUTPATIENT)
Dept: ORTHOPEDIC SURGERY | Facility: CLINIC | Age: 55
End: 2020-07-27

## 2020-07-27 VITALS
WEIGHT: 190 LBS | BODY MASS INDEX: 30.53 KG/M2 | HEART RATE: 80 BPM | SYSTOLIC BLOOD PRESSURE: 161 MMHG | HEIGHT: 66 IN | DIASTOLIC BLOOD PRESSURE: 83 MMHG

## 2020-07-27 DIAGNOSIS — Z47.89 ORTHOPEDIC AFTERCARE: Primary | ICD-10-CM

## 2020-07-27 DIAGNOSIS — M75.111 PARTIAL NONTRAUMATIC TEAR OF RIGHT ROTATOR CUFF: ICD-10-CM

## 2020-07-27 PROCEDURE — 99024 POSTOP FOLLOW-UP VISIT: CPT | Performed by: ORTHOPAEDIC SURGERY

## 2020-07-27 NOTE — PROGRESS NOTES
"     Patient ID: Alem Arzate is a 55 y.o. female.  7/24/20 right shoulder arthroscopy with supraspinatus repair and subacromial decompression  Pain mild    Objective:    /83   Pulse 80   Ht 167.6 cm (66\")   Wt 86.2 kg (190 lb)   BMI 30.67 kg/m²     Physical Examination:    Wounds are well approximated without infection.  Sensory and motor exam are intact all distributions. Radial pulse is palpable and capillary refill is less than two seconds to all digits    Imaging:      Assessment:  Doing well after cuff repair    Plan:  Wounds were cleaned and redressed. Restrictions discussed.  Begin therapy and see me in 4 weeks.  Remove dressings in two weeks  "

## 2020-07-27 NOTE — PATIENT INSTRUCTIONS
Shoulder Arthroscopy: Post- Operative Visit Objectives    1) Review the operative findings, procedures and photos.  2) Make sure medications are effective and not causing problems.  a) Pain: Oxycodone or hydrocodone is for pain. You may take 1 tablet every 6 hours as necessary.  Some patients don’t require the use of these…in those circumstances just use Tylenol extra-strength 1 or 2 tablets every 4-6 hours.   b) Naproxen 500 mg. For pain and inflammation.  You should take 1 every twice a day.  Do not use Aleve, Ibuprofen, Motrin or Advil during this time.  If you have had any problems stop taking these medicines and please tell us!  c) Keflex (cephalexin). This is an antibiotic to be taken as a preventive medicine.  Take 1 pill every 6 hours for 1 day. If you have a penicillin allergy this will be replaced by other options.  3) Wound Care:  a) Today we will change your dressings and cover your wounds with a plastic covering called Tegaderm. This will allow you to shower immediately.  Remove the tegaderm and underlying dressings in two weeks then ok to get wet in a shower. No Baths or swimming until 4 weeks after surgery.  Keep a towel on the dressing while applying ice.  4) Exercises and Physical Therapy Remember the protocol is 3 phases:  a) Healing (6 wks)  Continue the use of the sling for 6 weeks; depending on procedure utilized this may be shorter. You can do gentle range of motion exercise of the elbow and wrist immediately with the arm at the side.  Formal physical therapy will usually start in two weeks.    b) Rehabilitative (6 wks) You begin a more aggressive phase of physical therapy at the 6 week esme. Light strengthening and a continued emphasis on protecting the repair are important at this stage.  c) Restorative (4 wks)  Back to your sports and job activities gradually   5) Follow Up appointments Schedule Follow up visits as directed usually in 4 weeks. Physical therapy will be ordered today and you  should receive a phone call or can schedule yourself if appropriate.  6) Notes  Make sure you have all necessary notes and documentation for school or work.  7) Issues: Remember our goal is to make this process smooth and easy if there is any thing you need please ask us or call back [phone]  8)

## 2020-08-06 ENCOUNTER — TREATMENT (OUTPATIENT)
Dept: PHYSICAL THERAPY | Facility: CLINIC | Age: 55
End: 2020-08-06

## 2020-08-06 DIAGNOSIS — M75.111 NONTRAUMATIC INCOMPLETE TEAR OF RIGHT ROTATOR CUFF: Primary | ICD-10-CM

## 2020-08-06 DIAGNOSIS — M25.511 ACUTE PAIN OF RIGHT SHOULDER: ICD-10-CM

## 2020-08-06 DIAGNOSIS — Z47.89 ORTHOPEDIC AFTERCARE: ICD-10-CM

## 2020-08-06 PROCEDURE — 97110 THERAPEUTIC EXERCISES: CPT | Performed by: PHYSICAL THERAPIST

## 2020-08-06 PROCEDURE — 97161 PT EVAL LOW COMPLEX 20 MIN: CPT | Performed by: PHYSICAL THERAPIST

## 2020-08-06 PROCEDURE — 97014 ELECTRIC STIMULATION THERAPY: CPT | Performed by: PHYSICAL THERAPIST

## 2020-08-06 PROCEDURE — 97140 MANUAL THERAPY 1/> REGIONS: CPT | Performed by: PHYSICAL THERAPIST

## 2020-08-06 NOTE — PROGRESS NOTES
Physical Therapy Initial Evaluation and Plan of Care    Patient: Alem Arzate   : 1965  Diagnosis/ICD-10 Code:  Nontraumatic incomplete tear of right rotator cuff [M75.111]  Referring practitioner: Jamal Hylton, *    Subjective Evaluation    History of Present Illness  Date of surgery: 2020  Mechanism of injury: PMH:    Right shoulder pain, left knee pain, hyperlipidemia - See MR for full history        Subjective comment: Patient is a 55 year old female who presents status post right rotator cuff repair on 20.  Reports 6 months history of shoulder pain which did not improve with conservative care.  Reports MRI showed rotator cuff tear and surgery was recommended.   Patient Occupation:  Quality of life: good    Pain  Current pain ratin  At best pain ratin  At worst pain rating: 10  Pain location: Right shoulder   Relieving factors: ice, rest and medications  Progression: improved    Hand dominance: right    Diagnostic Tests  MRI studies: abnormal    Treatments  Previous treatment: physical therapy  Current treatment: physical therapy  Patient Goals  Patient goals for therapy: decreased pain, increased motion, increased strength, independence with ADLs/IADLs, return to sport/leisure activities and return to work  Patient's goals regarding treatment: Return to golfing.           Objective          Active Range of Motion     Additional Active Range of Motion Details  Deferred per post operative status    Passive Range of Motion     Right Shoulder   Flexion: 80 degrees   External rotation 45°: 0 degrees      General Comments     Shoulder Comments   HR: 85 BPM  O2 saturation: 98 %  BP: 144/84 mmHG    Incision covered with post operative dressing - no visible drainage or signs of infection         Assessment & Plan     Assessment  Impairments: abnormal or restricted ROM, activity intolerance, impaired physical strength, lacks appropriate home exercise program, pain with  function, safety issue and weight-bearing intolerance  Assessment details: Presents with limits in passive and active right shoulder ROM per pain and post operative limits and limits  Prognosis: good  Functional Limitations: carrying objects, lifting, sleeping, pulling, pushing, reaching overhead and unable to perform repetitive tasks  Goals  Plan Goals: ST. R shoulder PROM ER improved to 30 degrees or greater over 2 weeks.   2. R shoulder PROM scaption improved to 110 degrees or greater over 2 weeks.  3. Independent and compliant with HEP and post operative precautions.    LT. PROM R shoulder grossly full within 6 weeks.   2. Quick DASH for ADL's improved to 70% or greater over 10 weeks.   3. R shoulder strength all planes 4/5 or greater over 10 weeks.     Plan  Therapy options: will be seen for skilled physical therapy services  Planned modality interventions: cryotherapy, electrical stimulation/Russian stimulation, TENS and thermotherapy (hydrocollator packs)  Planned therapy interventions: manual therapy, neuromuscular re-education, soft tissue mobilization, flexibility, functional ROM exercises, strengthening, home exercise program, stretching, therapeutic activities and joint mobilization  Frequency: 2x week  Duration in visits: 20  Treatment plan discussed with: patient          Timed:         Manual Therapy:    20     mins  65801;     Therapeutic Exercise:    10     mins  73711;           Un-Timed:  Electrical Stimulation:    15     mins  95479 ( );  Low Eval     20     Mins  23733        Timed Treatment:   30   mins   Total Treatment:     65   mins    PT SIGNATURE: Reece Carpenter PT, DPT       DATE TREATMENT INITIATED: 2020    Initial Certification  Certification Period: 2020  I certify that the therapy services are furnished while this patient is under my care.  The services outlined above are required by this patient, and will be reviewed every 90 days.     PHYSICIAN: Warner  Jamal Milligan MD      DATE:     Please sign and return via fax to [phone].. Thank you, [facility] Physical Therapy.

## 2020-08-10 ENCOUNTER — TREATMENT (OUTPATIENT)
Dept: PHYSICAL THERAPY | Facility: CLINIC | Age: 55
End: 2020-08-10

## 2020-08-10 DIAGNOSIS — M25.511 ACUTE PAIN OF RIGHT SHOULDER: ICD-10-CM

## 2020-08-10 DIAGNOSIS — M75.111 NONTRAUMATIC INCOMPLETE TEAR OF RIGHT ROTATOR CUFF: Primary | ICD-10-CM

## 2020-08-10 PROCEDURE — 97014 ELECTRIC STIMULATION THERAPY: CPT | Performed by: PHYSICAL THERAPIST

## 2020-08-10 PROCEDURE — 97140 MANUAL THERAPY 1/> REGIONS: CPT | Performed by: PHYSICAL THERAPIST

## 2020-08-10 NOTE — PROGRESS NOTES
Physical Therapy Daily Progress Note  Visit: 2    Alem Arzate reports: has done well with HEP - slight soreness with stretching one time but improved since.     Subjective       Objective     PROM ER 15 degrees,  degrees R shoulder  See Exercise, Manual, and Modality Logs for complete treatment.       Assessment/Plan     Improved R shoulder PROM, decreased pain.     Plan:    Continue current - progress per protocol and patient tolerance           Timed:         Manual Therapy:    23     mins  71702;     Therapeutic Exercise:    5     mins  26000;         Un-Timed:  Electrical Stimulation:    15     mins  96484 ( );        Timed Treatment:   28   mins   Total Treatment:     43   mins  Reece Carpenter, PT, DPT  Physical Therapist

## 2020-08-13 ENCOUNTER — TREATMENT (OUTPATIENT)
Dept: PHYSICAL THERAPY | Facility: CLINIC | Age: 55
End: 2020-08-13

## 2020-08-13 DIAGNOSIS — M75.111 NONTRAUMATIC INCOMPLETE TEAR OF RIGHT ROTATOR CUFF: Primary | ICD-10-CM

## 2020-08-13 DIAGNOSIS — M25.511 ACUTE PAIN OF RIGHT SHOULDER: ICD-10-CM

## 2020-08-13 PROCEDURE — 97014 ELECTRIC STIMULATION THERAPY: CPT | Performed by: PHYSICAL THERAPIST

## 2020-08-13 PROCEDURE — 97140 MANUAL THERAPY 1/> REGIONS: CPT | Performed by: PHYSICAL THERAPIST

## 2020-08-13 NOTE — PROGRESS NOTES
Physical Therapy Daily Progress Note  Visit: 3    Alem Arzate reports: more sore today - no changes in activity.  Reports did well with last PT visit.     Subjective       Objective   PROM ER 30 degrees, scaption 105 degrees  See Exercise, Manual, and Modality Logs for complete treatment.       Assessment/Plan     Improved PROM - still pain with at endrange but decreased as session progressed.            Timed:         Manual Therapy:    30     mins  36767;         Un-Timed:  Electrical Stimulation:    15     mins  80114 ( );      Timed Treatment:   30   mins   Total Treatment:     45   mins  Reece Carpenter, PT, DPT  Physical Therapist

## 2020-08-16 PROCEDURE — 93010 ELECTROCARDIOGRAM REPORT: CPT | Performed by: INTERNAL MEDICINE

## 2020-08-19 ENCOUNTER — TREATMENT (OUTPATIENT)
Dept: PHYSICAL THERAPY | Facility: CLINIC | Age: 55
End: 2020-08-19

## 2020-08-19 DIAGNOSIS — M25.511 ACUTE PAIN OF RIGHT SHOULDER: ICD-10-CM

## 2020-08-19 DIAGNOSIS — M75.111 NONTRAUMATIC INCOMPLETE TEAR OF RIGHT ROTATOR CUFF: Primary | ICD-10-CM

## 2020-08-19 PROCEDURE — 97014 ELECTRIC STIMULATION THERAPY: CPT | Performed by: PHYSICAL THERAPIST

## 2020-08-19 PROCEDURE — 97140 MANUAL THERAPY 1/> REGIONS: CPT | Performed by: PHYSICAL THERAPIST

## 2020-08-19 NOTE — PROGRESS NOTES
Physical Therapy Daily Progress Note  Visit: 4    Alem Arzate reports: no new issues - less sore today.     Subjective       Objective     PROM ER 35 degrees, scaption 120 degrees R shoulder at end of treatment  See Exercise, Manual, and Modality Logs for complete treatment.       Assessment/Plan     Improved PROM and decreased pain today.  Returns to Dr. Hylton next week.     Plan:    Continue current - progress per protocol and patient tolerance.          Timed:         Manual Therapy:    28     mins  32429;            Un-Timed:  Electrical Stimulation:    15     mins  22579 ( );      Timed Treatment:   43   mins   Total Treatment:     43   mins  Reece Carpenter, PT, DPT  Physical Therapist

## 2020-08-24 ENCOUNTER — OFFICE VISIT (OUTPATIENT)
Dept: ORTHOPEDIC SURGERY | Facility: CLINIC | Age: 55
End: 2020-08-24

## 2020-08-24 VITALS
HEIGHT: 66 IN | DIASTOLIC BLOOD PRESSURE: 89 MMHG | BODY MASS INDEX: 30.53 KG/M2 | WEIGHT: 190 LBS | SYSTOLIC BLOOD PRESSURE: 142 MMHG | HEART RATE: 81 BPM

## 2020-08-24 DIAGNOSIS — Z47.89 ORTHOPEDIC AFTERCARE: Primary | ICD-10-CM

## 2020-08-24 PROCEDURE — 99024 POSTOP FOLLOW-UP VISIT: CPT | Performed by: ORTHOPAEDIC SURGERY

## 2020-08-24 NOTE — PROGRESS NOTES
2 weeks     Patient ID: Alem Arzate is a 55 y.o. female.  7/24/20 right shoulder arthroscopy with supraspinatus repair and subacromial decompression  Pain mild      Objective:    There were no vitals taken for this visit.    Physical Examination:  Incisions are healed, passive elevation 110 degrees, external rotation 30 degrees      Imaging:      Assessment:  Doing well after cuff repair    Plan:  Restrictions discussed. Continue physical therapy. See me in 8 weeks. Sling for 2 weeks

## 2020-08-27 ENCOUNTER — TREATMENT (OUTPATIENT)
Dept: PHYSICAL THERAPY | Facility: CLINIC | Age: 55
End: 2020-08-27

## 2020-08-27 DIAGNOSIS — Z47.89 ORTHOPEDIC AFTERCARE: ICD-10-CM

## 2020-08-27 DIAGNOSIS — M75.111 NONTRAUMATIC INCOMPLETE TEAR OF RIGHT ROTATOR CUFF: Primary | ICD-10-CM

## 2020-08-27 PROCEDURE — 97140 MANUAL THERAPY 1/> REGIONS: CPT | Performed by: PHYSICAL THERAPIST

## 2020-08-27 PROCEDURE — 97014 ELECTRIC STIMULATION THERAPY: CPT | Performed by: PHYSICAL THERAPIST

## 2020-08-27 NOTE — PROGRESS NOTES
Physical Therapy Daily Progress Note  Visit: 5    Alem Arzate reports: returned to MD on Monday with instruction to continue use of sling for 2 weeks.     Subjective       Objective     PROM R shoulder scaption 125 and ER 40 degrees post treatment  See Exercise, Manual, and Modality Logs for complete treatment.       Assessment/Plan     Improved PROM - some pain with ER initially but little post treatment.            Timed:         Manual Therapy:    25     mins  45389;     Therapeutic Exercise:    5     mins  18892;         Un-Timed:  Electrical Stimulation:    15     mins  24279 ( );      Timed Treatment:   30   mins   Total Treatment:     45   mins  Reece Carpenter, PT, DPT  Physical Therapist

## 2020-09-02 ENCOUNTER — TREATMENT (OUTPATIENT)
Dept: PHYSICAL THERAPY | Facility: CLINIC | Age: 55
End: 2020-09-02

## 2020-09-02 DIAGNOSIS — Z47.89 ORTHOPEDIC AFTERCARE: ICD-10-CM

## 2020-09-02 DIAGNOSIS — M75.111 NONTRAUMATIC INCOMPLETE TEAR OF RIGHT ROTATOR CUFF: Primary | ICD-10-CM

## 2020-09-02 PROCEDURE — 97110 THERAPEUTIC EXERCISES: CPT | Performed by: PHYSICAL THERAPIST

## 2020-09-02 PROCEDURE — 97014 ELECTRIC STIMULATION THERAPY: CPT | Performed by: PHYSICAL THERAPIST

## 2020-09-02 PROCEDURE — 97140 MANUAL THERAPY 1/> REGIONS: CPT | Performed by: PHYSICAL THERAPIST

## 2020-09-02 NOTE — PROGRESS NOTES
Physical Therapy Daily Progress Note  Visit: 6    Alem Arzate reports: feels ROM improved with PT and HEP but quickly returns to prior level.  Reports wants to review stretching for HEP with her  again today.     Subjective       Objective     PROM , ER 40 degrees - R shoulder  See Exercise, Manual, and Modality Logs for complete treatment.       Assessment/Plan     Improved FL in scapular plane passively.  Review HEP for ROM with Alem's  with good understanding post treatment.    Plan:    Continue current - out of sling this Friday - to progress per protocol               Timed:         Manual Therapy:    25     mins  77840;     Therapeutic Exercise:    10     mins  67303;         Un-Timed:  Electrical Stimulation:    15     mins  06244 ( );      Timed Treatment:   35   mins   Total Treatment:     50   mins  Reece Carpenter, PT, DPT  Physical Therapist

## 2020-09-09 ENCOUNTER — TREATMENT (OUTPATIENT)
Dept: PHYSICAL THERAPY | Facility: CLINIC | Age: 55
End: 2020-09-09

## 2020-09-09 DIAGNOSIS — M75.111 NONTRAUMATIC INCOMPLETE TEAR OF RIGHT ROTATOR CUFF: Primary | ICD-10-CM

## 2020-09-09 DIAGNOSIS — Z47.89 ORTHOPEDIC AFTERCARE: ICD-10-CM

## 2020-09-09 PROCEDURE — 97140 MANUAL THERAPY 1/> REGIONS: CPT | Performed by: PHYSICAL THERAPIST

## 2020-09-09 PROCEDURE — 97110 THERAPEUTIC EXERCISES: CPT | Performed by: PHYSICAL THERAPIST

## 2020-09-09 NOTE — PROGRESS NOTES
Physical Therapy Daily Progress Note  Visit: 7    Alem Arzate reports: she is out of sling and had some difficulty sleeping without until propping her arm on pillows.  Reports she does ROM exercise but feels improvement does not last long period of time afterwards.     Subjective       Objective     PROM 135 FL, ER  55 degrees post treatment in R shoulder.   See Exercise, Manual, and Modality Logs for complete treatment.       Assessment/Plan     Improved PROM into ER - good tolerance to scapular strengthening and R shoulder AROM.      Plan:    Continue current.            Timed:         Manual Therapy:    18     mins  36511;     Therapeutic Exercise:    25     mins  13006;         Timed Treatment:   43   mins   Total Treatment:     43   mins  Reece Carpenter, PT, DPT  Physical Therapist

## 2020-09-16 ENCOUNTER — TREATMENT (OUTPATIENT)
Dept: PHYSICAL THERAPY | Facility: CLINIC | Age: 55
End: 2020-09-16

## 2020-09-16 DIAGNOSIS — M75.111 NONTRAUMATIC INCOMPLETE TEAR OF RIGHT ROTATOR CUFF: Primary | ICD-10-CM

## 2020-09-16 DIAGNOSIS — M75.91 RIGHT SUPRASPINATUS TENDINITIS: ICD-10-CM

## 2020-09-16 DIAGNOSIS — M75.111 PARTIAL NONTRAUMATIC TEAR OF RIGHT ROTATOR CUFF: ICD-10-CM

## 2020-09-16 DIAGNOSIS — Z47.89 ORTHOPEDIC AFTERCARE: ICD-10-CM

## 2020-09-16 DIAGNOSIS — M75.51 SUBACROMIAL BURSITIS OF RIGHT SHOULDER JOINT: ICD-10-CM

## 2020-09-16 PROCEDURE — 97110 THERAPEUTIC EXERCISES: CPT | Performed by: PHYSICAL THERAPIST

## 2020-09-16 PROCEDURE — 97140 MANUAL THERAPY 1/> REGIONS: CPT | Performed by: PHYSICAL THERAPIST

## 2020-09-16 NOTE — PROGRESS NOTES
Physical Therapy Daily Progress Note  Visit: 8    Alem Arzate reports: ROM not improving as quickly as she would like.     Subjective       Objective   PROM  degrees, ER 65 degrees end of visit - AROM 95 degrees w/o compensation into FL R shoulder.   See Exercise, Manual, and Modality Logs for complete treatment.       Assessment/Plan     Improved PROM both into FL/ER R shoulder.  Good tolerance to strength progressions per protocol.     Plan:    Continue current           Timed:         Manual Therapy:    20     mins  71170;     Therapeutic Exercise:    25     mins  04695;           Timed Treatment:   45   mins   Total Treatment:     45   mins  Reece Carpenter, PT, DPT  Physical Therapist

## 2020-09-24 ENCOUNTER — TREATMENT (OUTPATIENT)
Dept: PHYSICAL THERAPY | Facility: CLINIC | Age: 55
End: 2020-09-24

## 2020-09-24 DIAGNOSIS — M75.111 NONTRAUMATIC INCOMPLETE TEAR OF RIGHT ROTATOR CUFF: Primary | ICD-10-CM

## 2020-09-24 DIAGNOSIS — Z47.89 ORTHOPEDIC AFTERCARE: ICD-10-CM

## 2020-09-24 PROCEDURE — 97140 MANUAL THERAPY 1/> REGIONS: CPT | Performed by: PHYSICAL THERAPIST

## 2020-09-24 PROCEDURE — 97110 THERAPEUTIC EXERCISES: CPT | Performed by: PHYSICAL THERAPIST

## 2020-09-24 NOTE — PROGRESS NOTES
Physical Therapy Daily Progress Note  Visit: 9    Alem Arzate reports: no new issues.     Subjective       Objective   See Exercise, Manual, and Modality Logs for complete treatment.       Assessment/Plan     Improving PROM all planes and AROM into FL to shoulder height w/o compensation or pain.               Timed:         Manual Therapy:    18     mins  28451;     Therapeutic Exercise:    27     mins  07550;           Timed Treatment:   45   mins   Total Treatment:     45   mins  Reece Carpenter PT, DPT  Physical Therapist

## 2020-09-30 ENCOUNTER — TREATMENT (OUTPATIENT)
Dept: PHYSICAL THERAPY | Facility: CLINIC | Age: 55
End: 2020-09-30

## 2020-09-30 DIAGNOSIS — Z47.89 ORTHOPEDIC AFTERCARE: ICD-10-CM

## 2020-09-30 DIAGNOSIS — M75.111 NONTRAUMATIC INCOMPLETE TEAR OF RIGHT ROTATOR CUFF: Primary | ICD-10-CM

## 2020-09-30 PROCEDURE — 97110 THERAPEUTIC EXERCISES: CPT | Performed by: PHYSICAL THERAPIST

## 2020-09-30 PROCEDURE — 97140 MANUAL THERAPY 1/> REGIONS: CPT | Performed by: PHYSICAL THERAPIST

## 2020-09-30 NOTE — PROGRESS NOTES
Physical Therapy Daily Progress Note  Visit: 10    Alem Arzate reports: she had some soreness in her shoulder after last visit.  Reports she is feeling better but still has discomfort with sleeping if she does not prop her arm on a pillow or moves her arm quickly.     Subjective       Objective   See Exercise, Manual, and Modality Logs for complete treatment.       Assessment/Plan     Progressing well with PROM/AROM and strengthening in R shoulder. Reviewed strength progression/regressions per symptoms for HEP.    Plan:    Continue current           Timed:         Manual Therapy:    12     mins  32403;     Therapeutic Exercise:    28     mins  37694;         Timed Treatment:   40   mins   Total Treatment:     40   mins  Reece Carpenter, PT, DPT  Physical Therapist

## 2020-10-03 ENCOUNTER — FLU SHOT (OUTPATIENT)
Dept: FAMILY MEDICINE CLINIC | Facility: CLINIC | Age: 55
End: 2020-10-03

## 2020-10-03 DIAGNOSIS — Z23 NEED FOR INFLUENZA VACCINATION: ICD-10-CM

## 2020-10-03 PROCEDURE — 90686 IIV4 VACC NO PRSV 0.5 ML IM: CPT | Performed by: FAMILY MEDICINE

## 2020-10-03 PROCEDURE — 90471 IMMUNIZATION ADMIN: CPT | Performed by: FAMILY MEDICINE

## 2020-10-08 ENCOUNTER — TREATMENT (OUTPATIENT)
Dept: PHYSICAL THERAPY | Facility: CLINIC | Age: 55
End: 2020-10-08

## 2020-10-08 DIAGNOSIS — M75.111 NONTRAUMATIC INCOMPLETE TEAR OF RIGHT ROTATOR CUFF: Primary | ICD-10-CM

## 2020-10-08 DIAGNOSIS — Z47.89 ORTHOPEDIC AFTERCARE: ICD-10-CM

## 2020-10-08 PROCEDURE — 97110 THERAPEUTIC EXERCISES: CPT | Performed by: PHYSICAL THERAPIST

## 2020-10-08 PROCEDURE — 97140 MANUAL THERAPY 1/> REGIONS: CPT | Performed by: PHYSICAL THERAPIST

## 2020-10-08 NOTE — PROGRESS NOTES
Physical Therapy Daily Progress Note  Visit: 11    Alem Arzate reports: no new issues today.  She reports that she will be out of town for several weeks soon and would like to review ROM exercise with her  to assist her when they are gone.     Subjective       Objective   See Exercise, Manual, and Modality Logs for complete treatment.       Assessment/Plan     Reviewed PROM with Alem's  and he was able to demonstrate correct technique.  AROM and strength improving in R shoulder - good exercise technique with minor cueing only during therapeutic exercise.      Plan:      Continue current           Timed:         Manual Therapy:    17     mins  42170;     Therapeutic Exercise:    30     mins  24811;            Timed Treatment:   47   mins   Total Treatment:     47   mins  Reece Carpenter, PT, DPT  Physical Therapist

## 2020-10-12 ENCOUNTER — OFFICE VISIT (OUTPATIENT)
Dept: ORTHOPEDIC SURGERY | Facility: CLINIC | Age: 55
End: 2020-10-12

## 2020-10-12 VITALS
DIASTOLIC BLOOD PRESSURE: 83 MMHG | HEIGHT: 66 IN | WEIGHT: 190 LBS | BODY MASS INDEX: 30.53 KG/M2 | HEART RATE: 76 BPM | SYSTOLIC BLOOD PRESSURE: 129 MMHG

## 2020-10-12 DIAGNOSIS — Z47.89 ORTHOPEDIC AFTERCARE: Primary | ICD-10-CM

## 2020-10-12 PROCEDURE — 99024 POSTOP FOLLOW-UP VISIT: CPT | Performed by: ORTHOPAEDIC SURGERY

## 2020-10-12 NOTE — PROGRESS NOTES
Patient ID: Alem Arzate is a 55 y.o. female.  7/24/20 right shoulder arthroscopy with supraspinatus repair and subacromial decompression  Pain mild      Objective:    There were no vitals taken for this visit.    Physical Examination:  Incisions are healed, passive elevation 160 degrees abduction 120 degrees external rotation 30 degrees with intact repair      Imaging:      Assessment:  Doing well after cuff repair    Plan:  Continue therapy, see me in 6 weeks

## 2020-10-15 ENCOUNTER — TREATMENT (OUTPATIENT)
Dept: PHYSICAL THERAPY | Facility: CLINIC | Age: 55
End: 2020-10-15

## 2020-10-15 DIAGNOSIS — M75.111 NONTRAUMATIC INCOMPLETE TEAR OF RIGHT ROTATOR CUFF: Primary | ICD-10-CM

## 2020-10-15 DIAGNOSIS — M75.91 RIGHT SUPRASPINATUS TENDINITIS: ICD-10-CM

## 2020-10-15 DIAGNOSIS — Z47.89 ORTHOPEDIC AFTERCARE: ICD-10-CM

## 2020-10-15 DIAGNOSIS — M75.111 PARTIAL NONTRAUMATIC TEAR OF RIGHT ROTATOR CUFF: ICD-10-CM

## 2020-10-15 PROCEDURE — 97140 MANUAL THERAPY 1/> REGIONS: CPT | Performed by: PHYSICAL THERAPIST

## 2020-10-15 PROCEDURE — 97110 THERAPEUTIC EXERCISES: CPT | Performed by: PHYSICAL THERAPIST

## 2020-10-15 NOTE — PROGRESS NOTES
Physical Therapy Daily Progress Note  Visit: 12    Alem Arzate reports: returned to Dr. Quevedo and he said she was on track for her rehab and to continue PT.     Subjective       Objective   See Exercise, Manual, and Modality Logs for complete treatment.       Assessment/Plan     Improving PROM - still limited especially into ER.  Strength increasing with minimal compensation with overhead motion until endrange.       Plan:    Continue current         Timed:         Manual Therapy:    17     mins  13957;     Therapeutic Exercise:    30     mins  54543;           Timed Treatment:   47   mins   Total Treatment:     47   mins  Reece Carpenter PT,DPT  Physical Therapist

## 2020-11-10 ENCOUNTER — TREATMENT (OUTPATIENT)
Dept: PHYSICAL THERAPY | Facility: CLINIC | Age: 55
End: 2020-11-10

## 2020-11-10 ENCOUNTER — TRANSCRIBE ORDERS (OUTPATIENT)
Dept: ADMINISTRATIVE | Facility: HOSPITAL | Age: 55
End: 2020-11-10

## 2020-11-10 DIAGNOSIS — Z12.31 VISIT FOR SCREENING MAMMOGRAM: Primary | ICD-10-CM

## 2020-11-10 DIAGNOSIS — Z47.89 ORTHOPEDIC AFTERCARE: ICD-10-CM

## 2020-11-10 DIAGNOSIS — M75.111 NONTRAUMATIC INCOMPLETE TEAR OF RIGHT ROTATOR CUFF: Primary | ICD-10-CM

## 2020-11-10 PROCEDURE — 97110 THERAPEUTIC EXERCISES: CPT | Performed by: PHYSICAL THERAPIST

## 2020-11-10 PROCEDURE — 97140 MANUAL THERAPY 1/> REGIONS: CPT | Performed by: PHYSICAL THERAPIST

## 2020-11-10 NOTE — PROGRESS NOTES
Physical Therapy Daily Progress Note  Visit: 13    Alem Arzate reports: feels strength is improving in her right shoulder - still some ROM limits.     Subjective       Objective   See Exercise, Manual, and Modality Logs for complete treatment.       Assessment/Plan     PROM near full into R ER.  Good improvement in R shoulder scaption PROM/AROM post tx.  Functional IR AROM R moderate limits but increased with review of home stretching and gentle manual assist into end ROM with instruction for HEP.     Plan:    Continue current.           Timed:         Manual Therapy:    27     mins  54544;     Therapeutic Exercise:    25     mins  38923;         Timed Treatment:   52   mins   Total Treatment:     52   mins  Reece Carpenter, PT, DPT  Physical Therapist  
Yes

## 2020-11-23 ENCOUNTER — OFFICE VISIT (OUTPATIENT)
Dept: ORTHOPEDIC SURGERY | Facility: CLINIC | Age: 55
End: 2020-11-23

## 2020-11-23 VITALS
HEIGHT: 66 IN | HEART RATE: 66 BPM | SYSTOLIC BLOOD PRESSURE: 126 MMHG | BODY MASS INDEX: 30.53 KG/M2 | DIASTOLIC BLOOD PRESSURE: 82 MMHG | WEIGHT: 190 LBS

## 2020-11-23 DIAGNOSIS — M75.111 PARTIAL NONTRAUMATIC TEAR OF RIGHT ROTATOR CUFF: Primary | ICD-10-CM

## 2020-11-23 PROCEDURE — 99213 OFFICE O/P EST LOW 20 MIN: CPT | Performed by: ORTHOPAEDIC SURGERY

## 2020-11-23 NOTE — PROGRESS NOTES
Patient ID: Alem Arzate is a 55 y.o. female.  Right shoulder pain  7/24/20 right shoulder arthroscopy with supraspinatus repair and subacromial decompression  Pain mild, doing home exercises as therapy denied any further visits secondary to insurance issues    Review of Systems:  Right shoulder pain  Denies chest pain    Objective:    There were no vitals taken for this visit.    Physical Examination:   She is a pleasant female in no distress. She is alert and oriented x3 and appears her stated age.  Right shoulder demonstrates healed incisions.  Passive elevation 160 degrees abduction 120 degrees external rotation 30 degrees internal rotation S1 with intact repair.Sensory and motor exam are intact all distributions. Radial pulse is palpable and capillary refill is less than two seconds to all digits      Imaging:       Assessment:    Mild stiffness after cuff repair    Plan:  Continue home exercise program.  We will request an additional 8 visits.  See me in 6 weeks          Disclaimer: Please note that areas of this note were completed with computer voice recognition software.  Quite often unanticipated grammatical, syntax, homophones, and other interpretive errors are inadvertently transcribed by the computer software. Please excuse any errors that have escaped final proofreading.

## 2020-11-25 ENCOUNTER — APPOINTMENT (OUTPATIENT)
Dept: MAMMOGRAPHY | Facility: HOSPITAL | Age: 55
End: 2020-11-25

## 2020-12-08 ENCOUNTER — APPOINTMENT (OUTPATIENT)
Dept: MAMMOGRAPHY | Facility: HOSPITAL | Age: 55
End: 2020-12-08

## 2021-01-04 ENCOUNTER — OFFICE VISIT (OUTPATIENT)
Dept: ORTHOPEDIC SURGERY | Facility: CLINIC | Age: 56
End: 2021-01-04

## 2021-01-04 VITALS
WEIGHT: 200 LBS | BODY MASS INDEX: 32.14 KG/M2 | SYSTOLIC BLOOD PRESSURE: 119 MMHG | HEIGHT: 66 IN | DIASTOLIC BLOOD PRESSURE: 78 MMHG | HEART RATE: 76 BPM

## 2021-01-04 DIAGNOSIS — M75.111 PARTIAL NONTRAUMATIC TEAR OF RIGHT ROTATOR CUFF: Primary | ICD-10-CM

## 2021-01-04 PROCEDURE — 20610 DRAIN/INJ JOINT/BURSA W/O US: CPT | Performed by: ORTHOPAEDIC SURGERY

## 2021-01-04 PROCEDURE — 99213 OFFICE O/P EST LOW 20 MIN: CPT | Performed by: ORTHOPAEDIC SURGERY

## 2021-01-04 RX ORDER — TRIAMCINOLONE ACETONIDE 40 MG/ML
40 INJECTION, SUSPENSION INTRA-ARTICULAR; INTRAMUSCULAR ONCE
Status: COMPLETED | OUTPATIENT
Start: 2021-01-04 | End: 2021-01-04

## 2021-01-04 RX ORDER — TOLTERODINE TARTRATE 2 MG/1
2 TABLET, EXTENDED RELEASE ORAL 2 TIMES DAILY
COMMUNITY
Start: 2020-12-08 | End: 2022-06-29

## 2021-01-04 RX ADMIN — TRIAMCINOLONE ACETONIDE 40 MG: 40 INJECTION, SUSPENSION INTRA-ARTICULAR; INTRAMUSCULAR at 13:04

## 2021-01-04 NOTE — PROGRESS NOTES
"     Patient ID: Alem Arzate is a 55 y.o. female.  Right shoulder pain  7/24/20 right shoulder arthroscopy with supraspinatus repair and subacromial decompression  Pain minimal.  Not at goal as she still has little bit of stiffness mainly behind her back    Review of Systems:    Right shoulder pain  Denies chest pain    Objective:    /78   Pulse 76   Ht 167.6 cm (66\")   Wt 90.7 kg (200 lb)   BMI 32.28 kg/m²     Physical Examination:   She is a pleasant female in no distress. She is alert and oriented x3 and appears her stated age.  Right shoulder demonstrates healed incisions.  Passive elevation 160 degrees abduction 120 degrees external rotation 30 degrees internal rotation L5 with intact repair.Sensory and motor exam are intact all distributions. Radial pulse is palpable and capillary refill is less than two seconds to all digits       Imaging:       Assessment:    Mild stiffness after cuff repair  Plan:   Treatment options discussed.  Continue home exercise program.  Activity as tolerated focusing on internal rotation exercises, for her stiffness I recommend injection after today's evaluation. Risks and benefits of the injection were discussed. Under sterile technique and written consent I injected 40mg of Kenalog and 1cc of 1% Lidocaine in the shoulder and subacromial space. It was well tolerated      Procedures          Disclaimer: Please note that areas of this note were completed with computer voice recognition software.  Quite often unanticipated grammatical, syntax, homophones, and other interpretive errors are inadvertently transcribed by the computer software. Please excuse any errors that have escaped final proofreading.  "

## 2021-03-16 ENCOUNTER — TELEPHONE (OUTPATIENT)
Dept: FAMILY MEDICINE CLINIC | Facility: CLINIC | Age: 56
End: 2021-03-16

## 2021-03-16 NOTE — TELEPHONE ENCOUNTER
Caller: Alem Arzate    Relationship: Self    Best call back number: [phone]     What medication are you requesting: SOMETHING FOR DIARRHEA    What are your current symptoms: DIARRHEA    How long have you been experiencing symptoms:  OVER 2 WEEKS    Have you had these symptoms before:    [] Yes  [x] No    Have you been treated for these symptoms before:   [] Yes  [x] No    If a prescription is needed, what is your preferred pharmacy and phone number: [id number] - [place], [place] - [address] [facility] AT [facility] [place] [address] [facility] [address] - [phone] PH - [phone] FX     Additional notes:PATIENT STATED THAT SHE IS OUT OF TOWN AND HAS BEEN EXPERIENCING DIARRHEA DAILY FOR OVER TWO WEEKS, SHE HAS BEEN USING PEPTO BISMOL AND IMODIUM TO CONTROL THIS. WOULD LIKE TO KNOW IF THE PROVIDER WILL SEND IN A MEDICATION TO THE PHARMACY? WOULD SHE RATHER DO A ZOOM VISIT? OR SHOULD THE PATIENT GO TO URGENT CARE IN [place]. SHE IS OUT OF TOWN TILL 3/27/21

## 2021-03-16 NOTE — TELEPHONE ENCOUNTER
She needs to be seen in [place] for this if this has been going on for 2 weeks despite taking immodium.

## 2021-05-07 ENCOUNTER — HOSPITAL ENCOUNTER (OUTPATIENT)
Dept: MAMMOGRAPHY | Facility: HOSPITAL | Age: 56
Discharge: HOME | End: 2021-05-07
Admitting: OBSTETRICS & GYNECOLOGY

## 2021-05-07 DIAGNOSIS — Z12.31 VISIT FOR SCREENING MAMMOGRAM: ICD-10-CM

## 2021-05-07 PROCEDURE — 77063 BREAST TOMOSYNTHESIS BI: CPT

## 2021-05-07 PROCEDURE — 77067 SCR MAMMO BI INCL CAD: CPT

## 2021-10-02 ENCOUNTER — FLU SHOT (OUTPATIENT)
Dept: FAMILY MEDICINE CLINIC | Facility: CLINIC | Age: 56
End: 2021-10-02

## 2021-10-02 DIAGNOSIS — Z23 NEED FOR VACCINATION: Primary | ICD-10-CM

## 2021-10-02 PROCEDURE — 90686 IIV4 VACC NO PRSV 0.5 ML IM: CPT | Performed by: FAMILY MEDICINE

## 2021-10-02 PROCEDURE — 90471 IMMUNIZATION ADMIN: CPT | Performed by: FAMILY MEDICINE

## 2021-12-21 ENCOUNTER — TRANSCRIBE ORDERS (OUTPATIENT)
Dept: ADMINISTRATIVE | Facility: HOSPITAL | Age: 56
End: 2021-12-21

## 2021-12-21 ENCOUNTER — LAB (OUTPATIENT)
Dept: LAB | Facility: HOSPITAL | Age: 56
End: 2021-12-21

## 2021-12-21 DIAGNOSIS — R73.9 HYPERGLYCEMIA: ICD-10-CM

## 2021-12-21 DIAGNOSIS — E78.5 HYPERLIPIDEMIA, UNSPECIFIED HYPERLIPIDEMIA TYPE: ICD-10-CM

## 2021-12-21 DIAGNOSIS — R63.5 WEIGHT GAIN: ICD-10-CM

## 2021-12-21 DIAGNOSIS — Z00.00 ROUTINE GENERAL MEDICAL EXAMINATION AT A HEALTH CARE FACILITY: ICD-10-CM

## 2021-12-21 DIAGNOSIS — Z00.00 ROUTINE GENERAL MEDICAL EXAMINATION AT A HEALTH CARE FACILITY: Primary | ICD-10-CM

## 2021-12-21 LAB
ALBUMIN SERPL-MCNC: 4.5 G/DL (ref 3.5–5.2)
ALBUMIN/GLOB SERPL: 2 G/DL
ALP SERPL-CCNC: 97 U/L (ref 39–117)
ALT SERPL W P-5'-P-CCNC: 17 U/L (ref 1–33)
ANION GAP SERPL CALCULATED.3IONS-SCNC: 7.9 MMOL/L (ref 5–15)
AST SERPL-CCNC: 11 U/L (ref 1–32)
BASOPHILS # BLD AUTO: 0.04 10*3/MM3 (ref 0–0.2)
BASOPHILS NFR BLD AUTO: 0.6 % (ref 0–1.5)
BILIRUB SERPL-MCNC: 0.7 MG/DL (ref 0–1.2)
BUN SERPL-MCNC: 8 MG/DL (ref 6–20)
BUN/CREAT SERPL: 10.4 (ref 7–25)
CALCIUM SPEC-SCNC: 9.6 MG/DL (ref 8.6–10.5)
CHLORIDE SERPL-SCNC: 104 MMOL/L (ref 98–107)
CHOLEST SERPL-MCNC: 130 MG/DL (ref 0–200)
CO2 SERPL-SCNC: 29.1 MMOL/L (ref 22–29)
CREAT SERPL-MCNC: 0.77 MG/DL (ref 0.57–1)
DEPRECATED RDW RBC AUTO: 41.3 FL (ref 37–54)
EOSINOPHIL # BLD AUTO: 0.1 10*3/MM3 (ref 0–0.4)
EOSINOPHIL NFR BLD AUTO: 1.4 % (ref 0.3–6.2)
ERYTHROCYTE [DISTWIDTH] IN BLOOD BY AUTOMATED COUNT: 12.3 % (ref 12.3–15.4)
GFR SERPL CREATININE-BSD FRML MDRD: 77 ML/MIN/1.73
GLOBULIN UR ELPH-MCNC: 2.3 GM/DL
GLUCOSE SERPL-MCNC: 107 MG/DL (ref 65–99)
HBA1C MFR BLD: 5 % (ref 3.5–5.6)
HCT VFR BLD AUTO: 43.1 % (ref 34–46.6)
HDLC SERPL-MCNC: 54 MG/DL (ref 40–60)
HGB BLD-MCNC: 14.6 G/DL (ref 12–15.9)
IMM GRANULOCYTES # BLD AUTO: 0.02 10*3/MM3 (ref 0–0.05)
IMM GRANULOCYTES NFR BLD AUTO: 0.3 % (ref 0–0.5)
LDLC SERPL CALC-MCNC: 55 MG/DL (ref 0–100)
LDLC/HDLC SERPL: 0.97 {RATIO}
LYMPHOCYTES # BLD AUTO: 2.13 10*3/MM3 (ref 0.7–3.1)
LYMPHOCYTES NFR BLD AUTO: 30.3 % (ref 19.6–45.3)
MCH RBC QN AUTO: 31.1 PG (ref 26.6–33)
MCHC RBC AUTO-ENTMCNC: 33.9 G/DL (ref 31.5–35.7)
MCV RBC AUTO: 91.7 FL (ref 79–97)
MONOCYTES # BLD AUTO: 0.69 10*3/MM3 (ref 0.1–0.9)
MONOCYTES NFR BLD AUTO: 9.8 % (ref 5–12)
NEUTROPHILS NFR BLD AUTO: 4.06 10*3/MM3 (ref 1.7–7)
NEUTROPHILS NFR BLD AUTO: 57.6 % (ref 42.7–76)
NRBC BLD AUTO-RTO: 0 /100 WBC (ref 0–0.2)
PLATELET # BLD AUTO: 215 10*3/MM3 (ref 140–450)
PMV BLD AUTO: 10.7 FL (ref 6–12)
POTASSIUM SERPL-SCNC: 4 MMOL/L (ref 3.5–5.2)
PROT SERPL-MCNC: 6.8 G/DL (ref 6–8.5)
RBC # BLD AUTO: 4.7 10*6/MM3 (ref 3.77–5.28)
SODIUM SERPL-SCNC: 141 MMOL/L (ref 136–145)
T4 FREE SERPL-MCNC: 1.16 NG/DL (ref 0.93–1.7)
TRIGL SERPL-MCNC: 119 MG/DL (ref 0–150)
TSH SERPL DL<=0.05 MIU/L-ACNC: 2.65 UIU/ML (ref 0.27–4.2)
VLDLC SERPL-MCNC: 21 MG/DL (ref 5–40)
WBC NRBC COR # BLD: 7.04 10*3/MM3 (ref 3.4–10.8)

## 2021-12-21 PROCEDURE — 80053 COMPREHEN METABOLIC PANEL: CPT

## 2021-12-21 PROCEDURE — 84443 ASSAY THYROID STIM HORMONE: CPT

## 2021-12-21 PROCEDURE — 85025 COMPLETE CBC W/AUTO DIFF WBC: CPT

## 2021-12-21 PROCEDURE — 83036 HEMOGLOBIN GLYCOSYLATED A1C: CPT

## 2021-12-21 PROCEDURE — 84439 ASSAY OF FREE THYROXINE: CPT

## 2021-12-21 PROCEDURE — 80061 LIPID PANEL: CPT

## 2021-12-21 PROCEDURE — 36415 COLL VENOUS BLD VENIPUNCTURE: CPT

## 2022-08-08 ENCOUNTER — TRANSCRIBE ORDERS (OUTPATIENT)
Dept: ADMINISTRATIVE | Facility: HOSPITAL | Age: 57
End: 2022-08-08

## 2022-08-08 DIAGNOSIS — Z12.31 VISIT FOR SCREENING MAMMOGRAM: Primary | ICD-10-CM

## 2022-08-22 ENCOUNTER — TRANSCRIBE ORDERS (OUTPATIENT)
Dept: ADMINISTRATIVE | Facility: HOSPITAL | Age: 57
End: 2022-08-22

## 2022-08-22 DIAGNOSIS — M54.16 LUMBAR RADICULOPATHY: Primary | ICD-10-CM

## 2022-08-22 DIAGNOSIS — M43.07 LUMBOSACRAL SPONDYLOLYSIS: ICD-10-CM

## 2022-09-12 ENCOUNTER — HOSPITAL ENCOUNTER (OUTPATIENT)
Dept: MAMMOGRAPHY | Facility: HOSPITAL | Age: 57
Discharge: HOME | End: 2022-09-12
Admitting: OBSTETRICS & GYNECOLOGY

## 2022-09-12 DIAGNOSIS — Z12.31 VISIT FOR SCREENING MAMMOGRAM: ICD-10-CM

## 2022-09-12 PROCEDURE — 77067 SCR MAMMO BI INCL CAD: CPT

## 2022-09-12 PROCEDURE — 77063 BREAST TOMOSYNTHESIS BI: CPT

## 2022-09-22 ENCOUNTER — APPOINTMENT (OUTPATIENT)
Dept: MRI IMAGING | Facility: HOSPITAL | Age: 57
End: 2022-09-22

## 2022-10-13 ENCOUNTER — APPOINTMENT (OUTPATIENT)
Dept: MRI IMAGING | Facility: HOSPITAL | Age: 57
End: 2022-10-13

## 2022-12-29 ENCOUNTER — TELEPHONE (OUTPATIENT)
Dept: PHYSICAL THERAPY | Facility: CLINIC | Age: 57
End: 2022-12-29

## 2022-12-29 NOTE — TELEPHONE ENCOUNTER
Caller: Alem Arzate    Relationship: Self    Best call back number:[phone]         What was the call regarding: PATIENT IS GOING OUT OF TOWN ON THE 17TH OF January AND WANTED TO KNOW IF YOU COULD GET HER IN SOONER, WE HAVE TO DO 2 WEEKS AT THE HUB, TRIED TO CALL. SHE SAID SHE LIVES RIGHT ACROSS THE [address] AND COULD BE THEIR IN 15 MIN IF YOU HAD A CANCELLATION. SHE WILL BE GONE FOR A COUPLE MONTHS WHEN SHE LEAVES

## 2023-01-12 ENCOUNTER — TREATMENT (OUTPATIENT)
Dept: PHYSICAL THERAPY | Facility: CLINIC | Age: 58
End: 2023-01-12
Payer: COMMERCIAL

## 2023-01-12 DIAGNOSIS — M17.0 OSTEOARTHRITIS OF BOTH KNEES, UNSPECIFIED OSTEOARTHRITIS TYPE: ICD-10-CM

## 2023-01-12 DIAGNOSIS — M25.561 PAIN IN BOTH KNEES, UNSPECIFIED CHRONICITY: Primary | ICD-10-CM

## 2023-01-12 DIAGNOSIS — M25.562 PAIN IN BOTH KNEES, UNSPECIFIED CHRONICITY: Primary | ICD-10-CM

## 2023-01-12 PROCEDURE — 97112 NEUROMUSCULAR REEDUCATION: CPT | Performed by: PHYSICAL THERAPIST

## 2023-01-12 PROCEDURE — 97110 THERAPEUTIC EXERCISES: CPT | Performed by: PHYSICAL THERAPIST

## 2023-01-12 PROCEDURE — 97161 PT EVAL LOW COMPLEX 20 MIN: CPT | Performed by: PHYSICAL THERAPIST

## 2023-01-12 NOTE — PROGRESS NOTES
Physical Therapy Initial Evaluation and Plan of Care  [address] [place] [address]     Patient: Alem Arzate   : 1965  Diagnosis/ICD-10 Code:  Pain in both knees, unspecified chronicity [M25.561, M25.562]  Referring practitioner: Colt Fournier MD  Date of Initial Visit: 2023  Today's Date: 2023  Patient seen for 1 sessions           Visit Diagnoses:    ICD-10-CM ICD-9-CM   1. Pain in both knees, unspecified chronicity  M25.561 719.46    M25.562    2. Osteoarthritis of both knees, unspecified osteoarthritis type  M17.0 715.96       Subjective Questionnaire: Knee outcomes score 56%      Subjective 59 yo female with c/o bilateral knee pain, R > L. Pt reports onset of pain in November after referring basketball. Pain gradually worsened, slowly noticed swelling. Went for ortho consult and dx with OA. Received steroid injections in December, which helped significantly. Reports some ROM deficits, which is concerning to her as she is considering a future TKA. 0/10 pain now and 6/10 at worst. Symptoms worsen when descending stairs, walking, standing. Symptoms improve ice, NSAIDs. Denies further LE symptoms, swelling has improved.   MD follow up: April  Social hx:  for [facility], going to [place] for 10 weeks      Objective          Tenderness   Left Knee   Tenderness in the medial joint line.     Right Knee   Tenderness in the medial joint line.     Active Range of Motion   Left Knee   Flexion: WFL  Extension: WFL    Right Knee   Flexion: WFL  Extension: Right knee active extension: -4.     Patellar Mobility   Left Knee Hypomobile in the left medial and left lateral patellar tendon(s).     Right Knee Hypomobile in the medial and lateral patellar tendon(s).     Strength/Myotome Testing     Left Knee   Flexion: 4+  Extension: 4+    Right Knee   Flexion: 4+  Extension: 4+          Assessment & Plan     Assessment  Impairments: abnormal gait, abnormal or restricted  ROM, activity intolerance, impaired balance, impaired physical strength, lacks appropriate home exercise program, pain with function and weight-bearing intolerance  Functional Limitations: carrying objects, lifting, walking, pulling, pushing, uncomfortable because of pain, standing and unable to perform repetitive tasks  Assessment details: 57 yo female with c/o bilateral knee pain. Pt is with a good response to treatment this date and would benefit from further HEP participation to help address her impairments and prepare for future TKA. Will DC therapy after this date due to pt leaving the area.    Prognosis: good    Goals  Plan Goals: Short term goals, 1 week: Tolerate HEP progression.  Voice compliance with activity modification.  Report improvement in symptoms.  LTGS, 10 weeks: Pt to return for further treatment if needed when she returns to [place].        Plan  Therapy options: will be seen for skilled therapy services  Other planned modality interventions: modalities prn  Planned therapy interventions: balance/weight-bearing training, flexibility, functional ROM exercises, gait training, home exercise program, manual therapy, neuromuscular re-education, strengthening, stretching and therapeutic activities  Treatment plan discussed with: patient  Plan details: DC therapy, pt leaving the area for 10 weeks.        History # of Personal Factors and/or Comorbidities: MODERATE (1-2)  Examination of Body System(s): # of elements: LOW (1-2)  Clinical Presentation: STABLE   Clinical Decision Making: LOW      Timed:         Manual Therapy:         mins  87531;     Therapeutic Exercise:    23     mins  61027;     Neuromuscular Justine:    8    mins  40789;    Therapeutic Activity:          mins  86031;     Gait Training:           mins  92718;     Ultrasound:          mins  70813;    Ionto                                   mins   42372  Self Care                            mins   48393    Un-Timed:  Electrical  Stimulation:         mins  73253 ( );  Traction          mins 13375  Low Eval     15     Mins  39601  Mod Eval          Mins  34168  High Eval                            Mins  44684  Re-Eval                               mins  62237        Timed Treatment:   29   mins   Total Treatment:     46   mins      PT SIGNATURE: Ankur Ahn PT, DPT, OCS  [place] license: [id number]  DATE TREATMENT INITIATED: 1/12/2023    Certification Period: @TDA @thru 4/11/2023  I certify that the therapy services are furnished while this patient is under my care.  The services outlined above are required for this patient and will be reviewed every 90 days.     PHYSICIAN: _____________________________    Colt Fournier MD      DATE:     Please sign and return via fax to [unfilled] . Thank you, [facility] Physical Therapy.

## 2023-05-25 RX ORDER — ATORVASTATIN CALCIUM 20 MG/1
20 TABLET, FILM COATED ORAL DAILY
COMMUNITY

## 2023-06-07 ENCOUNTER — ANESTHESIA EVENT (OUTPATIENT)
Dept: GASTROENTEROLOGY | Facility: HOSPITAL | Age: 58
End: 2023-06-07
Payer: COMMERCIAL

## 2023-06-07 ENCOUNTER — ANESTHESIA (OUTPATIENT)
Dept: GASTROENTEROLOGY | Facility: HOSPITAL | Age: 58
End: 2023-06-07
Payer: COMMERCIAL

## 2023-06-07 ENCOUNTER — ON CAMPUS - OUTPATIENT (AMBULATORY)
Dept: URBAN - METROPOLITAN AREA HOSPITAL 85 | Facility: HOSPITAL | Age: 58
End: 2023-06-07
Payer: COMMERCIAL

## 2023-06-07 ENCOUNTER — HOSPITAL ENCOUNTER (OUTPATIENT)
Facility: HOSPITAL | Age: 58
Setting detail: HOSPITAL OUTPATIENT SURGERY
Discharge: HOME | End: 2023-06-07
Attending: INTERNAL MEDICINE | Admitting: INTERNAL MEDICINE
Payer: COMMERCIAL

## 2023-06-07 VITALS
TEMPERATURE: 98.2 F | WEIGHT: 180.78 LBS | HEART RATE: 70 BPM | BODY MASS INDEX: 29.05 KG/M2 | OXYGEN SATURATION: 100 % | RESPIRATION RATE: 16 BRPM | DIASTOLIC BLOOD PRESSURE: 70 MMHG | HEIGHT: 66 IN | SYSTOLIC BLOOD PRESSURE: 124 MMHG

## 2023-06-07 DIAGNOSIS — K64.1 SECOND DEGREE HEMORRHOIDS: ICD-10-CM

## 2023-06-07 DIAGNOSIS — K57.30 DIVERTICULOSIS OF LARGE INTESTINE WITHOUT PERFORATION OR ABS: ICD-10-CM

## 2023-06-07 DIAGNOSIS — Z86.010 PERSONAL HISTORY OF COLONIC POLYPS: ICD-10-CM

## 2023-06-07 PROCEDURE — 25010000002 PROPOFOL 1000 MG/100ML EMULSION: Performed by: NURSE ANESTHETIST, CERTIFIED REGISTERED

## 2023-06-07 PROCEDURE — 25010000002 PROPOFOL 10 MG/ML EMULSION: Performed by: NURSE ANESTHETIST, CERTIFIED REGISTERED

## 2023-06-07 PROCEDURE — 45378 DIAGNOSTIC COLONOSCOPY: CPT | Mod: 33 | Performed by: INTERNAL MEDICINE

## 2023-06-07 RX ORDER — SODIUM CHLORIDE 9 MG/ML
INJECTION, SOLUTION INTRAVENOUS CONTINUOUS PRN
Status: DISCONTINUED | OUTPATIENT
Start: 2023-06-07 | End: 2023-06-07 | Stop reason: SURG

## 2023-06-07 RX ORDER — PROPOFOL 10 MG/ML
INJECTION, EMULSION INTRAVENOUS AS NEEDED
Status: DISCONTINUED | OUTPATIENT
Start: 2023-06-07 | End: 2023-06-07 | Stop reason: SURG

## 2023-06-07 RX ORDER — LIDOCAINE HYDROCHLORIDE 20 MG/ML
INJECTION, SOLUTION EPIDURAL; INFILTRATION; INTRACAUDAL; PERINEURAL AS NEEDED
Status: DISCONTINUED | OUTPATIENT
Start: 2023-06-07 | End: 2023-06-07 | Stop reason: SURG

## 2023-06-07 RX ADMIN — LIDOCAINE HYDROCHLORIDE 60 MG: 20 INJECTION, SOLUTION EPIDURAL; INFILTRATION; INTRACAUDAL; PERINEURAL at 13:56

## 2023-06-07 RX ADMIN — PROPOFOL INJECTABLE EMULSION 70 MG: 10 INJECTION, EMULSION INTRAVENOUS at 13:56

## 2023-06-07 RX ADMIN — SODIUM CHLORIDE: 0.9 INJECTION, SOLUTION INTRAVENOUS at 13:52

## 2023-06-07 RX ADMIN — PROPOFOL 100 MCG/KG/MIN: 10 INJECTION, EMULSION INTRAVENOUS at 13:56

## 2023-06-07 NOTE — ANESTHESIA POSTPROCEDURE EVALUATION
Patient: Alem Arzate    Procedure Summary       Date: 06/07/23 Room / Location: [facility] ENDOSCOPY 4 / [facility] ENDOSCOPY    Anesthesia Start: 1352 Anesthesia Stop: 1414    Procedure: COLONOSCOPY (Rectum) Diagnosis:       Personal history of colonic polyps      (Personal history of colonic polyps [Z86.010])    Surgeons: Louann Thompson MD Provider: Lydia Watt MD    Anesthesia Type: general, MAC ASA Status: 2            Anesthesia Type: general, MAC    Vitals  Vitals Value Taken Time   /70 06/07/23 1440   Temp     Pulse 70 06/07/23 1440   Resp 16 06/07/23 1440   SpO2 100 % 06/07/23 1440           Post Anesthesia Care and Evaluation    Patient location during evaluation: PACU  Patient participation: complete - patient participated  Level of consciousness: awake and alert  Pain management: satisfactory to patient    Airway patency: patent  Anesthetic complications: No anesthetic complications  PONV Status: none  Cardiovascular status: acceptable  Respiratory status: acceptable  Hydration status: acceptable

## 2023-06-07 NOTE — DISCHARGE INSTRUCTIONS
A responsible adult should stay with you and you should rest quietly for the rest of the day.    Do not drink alcohol, drive, operate any heavy machinery or power tools or make any legal/important decisions for the next 24 hours.     Progress your diet as tolerated.  If you begin to experience severe pain, increased shortness of breath, racing heartbeat or a fever above 101 F, seek immediate medical attention.     Follow up with MD as instructed. Call office for results in 3 to 5 days if needed.     DR THOMPSON [phone]    Impression:  1.  Moderate-sized internal/external hemorrhoid at least grade 2.  2.  Scattered diverticulosis left side of the colon.     Recommendations:  Follow up with GI clinic as needed  Follow up with PCP as scheduled  Follow up with biopsy report  Repeat colonoscopy in 7 years  Benefiber 1 scoop 2x/day         Louann Thompson MD      Date: 6/7/2023    Time: 14:11 EDT

## 2023-06-07 NOTE — H&P
Patient Care Team:  Ree Rondon MD as PCP - General (Family Medicine)      Subjective .     History of present illness:    Alem Arzate is a 58 y.o. female who presents today for Procedure(s):  COLONOSCOPY for the indications listed below.     The updated Patient Profile was reviewed prior to the procedure, in conjunction with the Physical Exam, including medical conditions, surgical procedures, medications, allergies, family history and social history.     Pre-operatively, I reviewed the indication(s) for the procedure, the risks of the procedure [including but not limited to: unexpected bleeding possibly requiring hospitalization and/or unplanned repeat procedures, perforation possibly requiring surgical treatment, missed lesions and complications of sedation/MAC (also explained by anesthesia staff)].     I have evaluated the patient for risks associated with the planned anesthesia and the procedure to be performed and find the patient an acceptable candidate for IV sedation.    Multiple opportunities were provided for any questions or concerns, and all questions were answered satisfactorily before any anesthesia was administered. We will proceed with the planned procedure.      ASSESSMENT/PLAN:  58 years old female for colonoscopy      Past Medical History:  Past Medical History:   Diagnosis Date    Arthritis        Past Surgical History:  Past Surgical History:   Procedure Laterality Date    BLADDER REPAIR  2001    COLONOSCOPY N/A 1/6/2020    Procedure: COLONOSCOPY WITH POLYPECTOMY X2;  Surgeon: Miah Toribio MD;  Location: [facility] ENDOSCOPY;  Service: Gastroenterology    KNEE ARTHROSCOPY Left 1/25/2017    Procedure: LEFT KNEE ARTHROSCOPY WITH CHONDROPLASTY  AND  DRILLING;  Surgeon: Colt Fournier MD;  Location: [facility] OR [facility] [place];  Service:     KNEE MENISCAL REPAIR Right     SHOULDER ARTHROSCOPY W/ ROTATOR CUFF REPAIR Right 7/24/2020    Procedure: SHOULDER ARTHROSCOPY WITH ROTATOR CUFF REPAIR,  decompression, capsular release  ;  Surgeon: Jamal Hylton MD;  Location: [facility] MAIN OR;  Service: Orthopedics;  Laterality: Right;    TOOTH EXTRACTION         Social History:  Social History     Tobacco Use    Smoking status: Never    Smokeless tobacco: Never   Vaping Use    Vaping Use: Never used   Substance Use Topics    Alcohol use: Yes     Alcohol/week: 5.0 standard drinks     Types: 2 Shots of liquor, 3 Cans of beer per week    Drug use: No       Family History:  Family History   Problem Relation Age of Onset    Diabetes Mother     Lupus Mother     COPD Father     Lung cancer Father        Medications:  No medications prior to admission.       Scheduled Meds:  Continuous Infusions:No current facility-administered medications for this encounter.    PRN Meds:.    ALLERGIES:  Codeine        Objective     Vital Signs:        Physical Exam:      General Appearance:    Awake and alert, in no acute distress   Lungs:     Clear to auscultation bilaterally, respirations regular, even and unlabored    Heart:    Regular rhythm and normal rate, normal S1 and S2, no            murmur, no gallop, no rub   Abdomen:     Normal bowel sounds, soft, non-tender, no rebound or guarding, non-distended, no hepatosplenomegaly        Results Review:   I reviewed the patient's labs and imaging.  Lab Results (last 24 hours)       ** No results found for the last 24 hours. **            Imaging Results (Last 24 Hours)       ** No results found for the last 24 hours. **               I discussed the patients findings and my recommendations with the patient.  Louann Thompson MD  06/07/23  12:18 EDT

## 2023-06-07 NOTE — ANESTHESIA PREPROCEDURE EVALUATION
Anesthesia Evaluation     Patient summary reviewed and Nursing notes reviewed   history of anesthetic complications:  PONV  NPO Solid Status: > 8 hours  NPO Liquid Status: > 8 hours           Airway   Mallampati: III  TM distance: >3 FB  Neck ROM: full  No difficulty expected and Anterior  Dental - normal exam     Pulmonary - negative pulmonary ROS and normal exam   (-) not a smoker  Cardiovascular - normal exam  Exercise tolerance: good (4-7 METS)    ECG reviewed    (+) hyperlipidemia  (-) past MI, CAD      Neuro/Psych- negative ROS  (-) CVA  GI/Hepatic/Renal/Endo - negative ROS     Musculoskeletal     Abdominal  - normal exam    Bowel sounds: normal.   Substance History - negative use     OB/GYN negative ob/gyn ROS         Other   arthritis,                     Anesthesia Plan    ASA 2     general and MAC     intravenous induction     Anesthetic plan, risks, benefits, and alternatives have been provided, discussed and informed consent has been obtained with: patient.    Plan discussed with CRNA.

## 2023-06-07 NOTE — OP NOTE
COLONOSCOPY Procedure Report    Patient Name:  Alem Arzate  YOB: 1965    Date of Surgery:  6/7/2023     Pre-Op Diagnosis:  Personal history of colonic polyps [Z86.010]         Procedure/CPT® Codes:      Procedure(s):  COLONOSCOPY    Staff:  Surgeon(s):  Louann Thompson MD      Anesthesia: Monitored Anesthesia Care    Description of Procedure:  A description of the procedure as well as risks, benefits and alternative methods were explained to the patient who voiced understanding and signed the corresponding consent form. A physical exam was performed and vital signs were monitored throughout the procedure.    A rectal exam was performed which was normal. An Olympus colonoscope was placed into the rectum and proceeded under direct visualization through the colon until the cecum and appendiceal orifice were identified. Careful visualization occurred upon slow withdraw of the scope. The scope was then retroflexed and the distal rectum was visualized. The quality of the prep was good. The procedure was not difficult and there were no immediate complications.    Findings:   Prep was very well.  Terminal ileum cecum ascending transverse ascending colon mucosa all looks normal.  Scattered diverticulosis was noticed with a moderate-sized internal/external hemorrhoid.  No other obvious lesion was noticed.    Impression:  1.  Moderate-sized internal/external hemorrhoid at least grade 2.  2.  Scattered diverticulosis left side of the colon.    Recommendations:  Follow up with GI clinic as needed  Follow up with PCP as scheduled  Follow up with biopsy report  Repeat colonoscopy in 7 years  Benefiber 1 scoop 2x/day       Louann Thompson MD     Date: 6/7/2023    Time: 14:11 EDT

## 2023-06-07 NOTE — H&P
Patient Care Team:  Ree Rondon MD as PCP - General (Family Medicine)      Subjective .     History of present illness:    Alem Arzate is a 58 y.o. female who presents today for Procedure(s):  COLONOSCOPY for the indications listed below.     The updated Patient Profile was reviewed prior to the procedure, in conjunction with the Physical Exam, including medical conditions, surgical procedures, medications, allergies, family history and social history.     Pre-operatively, I reviewed the indication(s) for the procedure, the risks of the procedure [including but not limited to: unexpected bleeding possibly requiring hospitalization and/or unplanned repeat procedures, perforation possibly requiring surgical treatment, missed lesions and complications of sedation/MAC (also explained by anesthesia staff)].     I have evaluated the patient for risks associated with the planned anesthesia and the procedure to be performed and find the patient an acceptable candidate for IV sedation.    Multiple opportunities were provided for any questions or concerns, and all questions were answered satisfactorily before any anesthesia was administered. We will proceed with the planned procedure.      ASSESSMENT/PLAN:  58 years old female with history of polyps here for surveillance colonoscopy      Past Medical History:  Past Medical History:   Diagnosis Date    Arthritis        Past Surgical History:  Past Surgical History:   Procedure Laterality Date    BLADDER REPAIR  2001    COLONOSCOPY N/A 1/6/2020    Procedure: COLONOSCOPY WITH POLYPECTOMY X2;  Surgeon: Miah Toribio MD;  Location: [facility] ENDOSCOPY;  Service: Gastroenterology    KNEE ARTHROSCOPY Left 1/25/2017    Procedure: LEFT KNEE ARTHROSCOPY WITH CHONDROPLASTY  AND  DRILLING;  Surgeon: Colt Fournier MD;  [place] [facility] [place] [facility] [place]  Service:     KNEE MENISCAL REPAIR Right     SHOULDER ARTHROSCOPY W/ ROTATOR CUFF REPAIR Right 7/24/2020    Procedure: SHOULDER  ARTHROSCOPY WITH ROTATOR CUFF REPAIR, decompression, capsular release  ;  Surgeon: Jamal Hylton MD;  Location: [facility] MAIN OR;  Service: Orthopedics;  Laterality: Right;    TOOTH EXTRACTION         Social History:  Social History     Tobacco Use    Smoking status: Never    Smokeless tobacco: Never   Vaping Use    Vaping Use: Never used   Substance Use Topics    Alcohol use: Yes     Alcohol/week: 5.0 standard drinks     Types: 2 Shots of liquor, 3 Cans of beer per week    Drug use: No       Family History:  Family History   Problem Relation Age of Onset    Diabetes Mother     Lupus Mother     COPD Father     Lung cancer Father        Medications:  Medications Prior to Admission   Medication Sig Dispense Refill Last Dose    atorvastatin (LIPITOR) 20 MG tablet Take 1 tablet by mouth Daily.   6/6/2023    calcium carbonate (TUMS) 500 MG chewable tablet Chew 1 tablet As Needed for Indigestion or Heartburn.   Past Week    Multiple Vitamins-Minerals (MULTI COMPLETE PO) Take 1 tablet by mouth Daily. Stop 7- for surgery   Past Week    cyclobenzaprine (FLEXERIL) 10 MG tablet 1 tablet p.o. every 8 hours as needed 15 tablet 0 More than a month       Scheduled Meds:  Continuous Infusions:No current facility-administered medications for this encounter.    PRN Meds:.    ALLERGIES:  Codeine        Objective     Vital Signs:   Temp:  [98.2 °F (36.8 °C)] 98.2 °F (36.8 °C)  Heart Rate:  [74] 74  Resp:  [22] 22  BP: (152)/(82) 152/82    Physical Exam:      General Appearance:    Awake and alert, in no acute distress   Lungs:     Clear to auscultation bilaterally, respirations regular, even and unlabored    Heart:    Regular rhythm and normal rate, normal S1 and S2, no            murmur, no gallop, no rub   Abdomen:     Normal bowel sounds, soft, non-tender, no rebound or guarding, non-distended, no hepatosplenomegaly        Results Review:   I reviewed the patient's labs and imaging.  Lab Results (last 24 hours)        ** No results found for the last 24 hours. **            Imaging Results (Last 24 Hours)       ** No results found for the last 24 hours. **               I discussed the patients findings and my recommendations with the patient.  Louann Thompson MD  06/07/23  14:10 EDT

## 2023-08-03 ENCOUNTER — E-VISIT (OUTPATIENT)
Dept: FAMILY MEDICINE CLINIC | Facility: TELEHEALTH | Age: 58
End: 2023-08-03
Payer: COMMERCIAL

## 2023-08-03 PROCEDURE — BRIGHTMDVISIT: Performed by: NURSE PRACTITIONER

## 2023-09-03 ENCOUNTER — E-VISIT (OUTPATIENT)
Dept: FAMILY MEDICINE CLINIC | Facility: TELEHEALTH | Age: 58
End: 2023-09-03
Payer: COMMERCIAL

## 2023-09-04 NOTE — E-VISIT TREATED
Chief Complaint: Rashes and other skin conditions   Patient introduction   Patient is 58-year-old female presenting with pruritic, nonpainful rash on their arms, hands, and legs for 3 to 5 days.   Before the rash appeared, patient had contact with poison oak/ivy/sumac in the affected area.   General presentation   No fever, chills, myalgia, nausea, vomiting, diarrhea, headache, or fatigue/lethargy.   The following treatments were helpful for current rash symptoms:    Diphenhydramine pills   The following treatments were not helpful for current rash symptoms:    Calamine lotion    Moisturizing lotion   Chickenpox: Has had chickenpox.   Scabies: No recent scabies exposure.   Impetigo: No recent impetigo exposure.   Pruritus described as severe and is worsening. Itching makes daily activities difficult. Itching makes it difficult to sleep.   Rash has spread to a new location.   No history of prior MRSA infection.   Review of red flags/alarm symptoms:    No systemic symptoms    No symptoms of anaphylactic reaction    No swollen lymph nodes    No recent history suggesting adverse drug rash (no new medication, herb, or supplement)   Pregnancy/breastfeeding: Patient is postmenopausal.   Current medications   Currently taking atorvastatin 20 MG tablet and MULTI COMPLETE PO.   Medication allergies   No relevant drug allergies.   Medication contraindication review   Not currently taking ACE inhibitors or ARBs.   No cerebral malaria, CHF, cutaneous xhptm-jldthb-ilhb disease, folate deficiency, G6PD deficiency (or breastfeeding a child with G6PD deficiency), generalized erythroderma, liver disease, lamellar ichthyosis, malignancy or premalignancy at the affected site, megaloblastic anemia, mononucleosis, Netherton syndrome, peripheral neuropathy, porphyria, QT prolongation, congenital long QT syndrome, skin barrier defect condition, systemic mycoses, TMP/SMX-associated thrombocytopenia, thyroid dysfunction, or ulcerative  colitis.   No history of myasthenia gravis, aortic aneurysm or dissection, Marfan syndrome, or Nicko-Danlos syndrome.   Past medical history   Immune conditions: No immunocompromising conditions.   No history of cancer.   Patient-submitted comments   Patient was asked if they had anything to add about their symptoms. Patient writes: I'm allergic to poison ivy and this appears to be the same as I've had in the past (usually every few years when I'm doing yard work. Usually, prednisone or some type of steroid is prescribed, and it clears up the rash after a few days. .   Patient did not request an excuse note.   Assessment   Poison ivy/oak/sumac.   This is the likely diagnosis based on interview responses and patient-submitted photos, including:    Symptom profile    Recent known exposure to poison oak/ivy/sumac   Plan   Medications:    prednisone 20 mg tablet RX 20mg as directed PO qd 21d taper. 3 tabs PO QD x 7d, then 2 tabs PO QD x 7d, then 1 tab PO QD x 7d. Take with food. Amount is 42 tab.   The patient's prescription will be sent to:   [id number]   [address] [place] [id number]   Phone: [phone]     Fax: [phone]   Education:    Condition and causes    Prevention    Treatment and self-care    When to call provider   ----------   Electronically signed by DORIS Woodward FNP-BC on 2023-09-03 at 20:41PM   ----------   Patient Interview Transcript:   Where is the affected area located? Select all that apply.    Arm    Hand    Leg   Not selected:    Scalp    Face    Inside mouth or on lips    Neck    Chest    Stomach    Back    Buttocks    Groin    Foot or in between toes    None of the above   Which arm is bothering you? Select one.    Both   Not selected:    Right    Left   Which hand is bothering you? Select one.    Both   Not selected:    Right    Left   Which leg is bothering you? Select one.    Both   Not selected:    Right    Left   Before your skin symptoms  appeared, were you exposed to any of these possible triggers? Select all that apply.    Contact with poison oak, poison ivy, or poison sumac in the affected area   Not selected:    Tick bite    Other insect bite or sting in the affected area    Dog or cat bite    Cut, scrape, or other skin injury in the affected area    Contact with a new soap, perfume, skincare product, cleaning product, or other chemical in the affected area    Wearing new jewelry, belt buckle, or other metal accessory in the affected area    Taking a new medication, supplement, or herb    Consuming a new food or drink    Vaccine injection    Exposure to extreme hot or cold temperatures    Exercising intensely    Sitting in a hot tub or swimming in a heated swimming pool    Wearing ill-fitting shoes or socks for a long time    Contact with someone who has a similar rash    Contact with someone who has impetigo    Contact with someone who has scabies    Other (specify)    None of the above   Have you ever had chickenpox? Select one.    Yes   Not selected:    No    I'm not sure   Since your skin symptoms first appeared, have they spread or shown up in new locations? This includes covering a larger area than they first did, or spreading to another part of the body. Select one.    Yes   Not selected:    No   Which of these describe the affected area? Select all that apply.    Itchy   Not selected:    Painful    Warmer than other areas of my skin    None of the above   How intense is the itching? Select one.    Severe   Not selected:    Mild    Moderate    Unbearable   Has the itching gotten better, worse, or stayed the same? Select one.    Worse   Not selected:    Better    Same   Has the itching made daily activities difficult? Select one.    Yes   Not selected:    No   Has the itching made it difficult to sleep? Select one.    Yes   Not selected:    No   How long have you had these current skin symptoms? Select one.    3 to 5 days   Not selected:     Less than 24 hours    24 to 48 hours    2 to 3 days    5 to 7 days    1 to 2 weeks    More than 2 weeks   To recommend the best treatment for you, we need to see photos of the affected area.   [image: [email or web address]]   Close-up detail (for size, shape, and color)   [image: [email or web address]]   Surrounding area (to compare with healthy skin)   [image: [email or web address]]   Affected area at a distance (for scale and location)   If you choose not to send photos, you'll need to speak with a provider to get care.    Select one.    OK, I'll send photos.   Not selected:    I'd rather not send photos. Show me my care options.   Send at least 3 photos for review - Don't use a flash. - Make sure the photos are in focus. - Take a close-up photo (for size, shape, color). - Take a photo showing surrounding area (to compare with healthy skin). - Take a photo with a quarter coin or ruler near the affected area to show scale. - If more than one location is affected, repeat for each location.    Upload 1    Upload 2   Not selected:    Upload 3    Upload 4    Upload 5   A rash can be a sign of a more serious condition. These conditions may need in-person care for an exam or lab tests. Along with your skin symptoms, have you had any of these symptoms? Select all that apply.    None of these   Not selected:    Fever    Chills    Body aches or muscle aches    Nausea    Vomiting    Diarrhea    Headache    Fatigue, exhaustion, or lethargy (feeling drowsy, dull, or low energy)   Have you had swollen lymph nodes? Swollen lymph nodes are small lumps under the skin that are soft, tender, and often painful. They may be noticed in the neck, the armpits, or the groin. Select one.    No, not that I've noticed   Not selected:    Yes   Along with your skin symptoms, have you had any of these symptoms? Select all that apply.    None of these   Not selected:    Chest pains or rapid heartbeat     Shortness of breath or wheezing    Swelling of lips or tongue    Throat tightness or hoarse voice    Stomach cramps, diarrhea, or vomiting    Confusion or dizziness   Do you or does anyone in your family have a history of allergies (hay fever, seasonal allergies), eczema, or asthma? Select all that apply.    No, not that I know of   Not selected:    Yes, I do    Yes, a family member does   Have you ever been treated for a MRSA (methicillin-resistant Staphylococcus aureus) infection? MRSA is a type of bacteria that's resistant to many commonly used antibiotics. Select one.    No, not that I know of   Not selected:    Yes   Do you have any of these conditions? Scroll to see all options. Select all that apply.    None of the above   Not selected:    Cerebral malaria    Congenital long QT syndrome    Folate deficiency    Systemic fungal infection, such as invasive candidiasis    G6PD deficiency, or breastfeeding a child with G6PD deficiency    Infection at the affected area    Megaloblastic anemia    Mono (mononucleosis)    Decreased sensation in feet (peripheral neuropathy)    Porphyria    Skin cancer or pre-malignancy at the affected area    A serious skin condition (skin barrier defect condition, Netherton syndrome, lamellar ichthyosis, generalized erythroderma, or cutaneous efsme-dbytpv-ltck disease)    QT prolongation    Thyroid dysfunction    Ulcerative colitis   Do you have any of these conditions that can affect the immune system? Scroll to see all options. Select all that apply.    None of these   Not selected:    History of bone marrow transplant    Chronic kidney disease    Chronic liver disease (including cirrhosis)    HIV/AIDS    Inflammatory bowel disease (Crohn's disease or ulcerative colitis)    Lupus    Moderate to severe plaque psoriasis    Multiple sclerosis    Rheumatoid arthritis    Sickle cell anemia    Alpha or beta thalassemia    History of solid organ transplant (kidney, liver, or heart)     History of spleen removal    An autoimmune disorder not listed here (specify)    A condition requiring treatment with long-term use of oral steroids (such as prednisone, prednisolone, or dexamethasone) (specify)   Have you ever been diagnosed with cancer? Select one.    No   Not selected:    Yes, I have cancer now    Yes, but I'm in remission   Do you have any of these conditions? Scroll to see all options. Select all that apply.    None of the above   Not selected:    Myasthenia gravis    History of aortic aneurysm or dissection    Marfan syndrome    Nicko-Danlos syndrome   Are you currently being treated for type 1 or type 2 diabetes? Select one.    No   Not selected:    Yes   Have you gone through menopause? Select one.    Yes   Not selected:    No    I'm going through it now   Have you tried any treatments for your current symptoms? Select one.    Yes   Not selected:    No   Diphenhydramine (Benadryl) pills    Helpful   Not selected:    Not helpful   Calamine lotion    Not helpful   Not selected:    Helpful   A lotion or cream for dry skin    Not helpful   Not selected:    Helpful   Are you taking any of these medications? Select all that apply.    No   Not selected:    An ACE inhibitor, such as lisinopril, enalapril, captopril, or benazepril    An angiotensin II receptor blocker (ARB), such as candesartan, irbesartan, losartan, or valsartan    Kynmobi or Apokyn (apomorphine)   Are you still taking these medications listed in your medical record? If you're not taking any of these, click Next. Select all that apply.    atorvastatin 20 MG tablet    MULTI COMPLETE PO   Are you taking any other medications, vitamins, or supplements? Select one.    No   Not selected:    Yes   Have you ever had an allergic or bad reaction to any medication? Select one.    Yes   Not selected:    No   Have you had an allergic or bad reaction to any of these antibiotic medications? Select all that apply.    No, not that I know of   Not  "selected:    Penicillin or any \"-cillin\" antibiotic, such as amoxicillin, ampicillin, dicloxacillin, nafcillin, or piperacillin (Brands include Augmentin, Unasyn, and Zosyn)    Tetracycline or any \"-cycline\" antibiotic, such as doxycycline, demeclocycline, or minocycline (Brands include Doryx, Dynacin, Oracea, Monodox, and Vibramycin)    Ciprofloxacin or any \"-floxacin\" antibiotic, such as gemifloxacin, levofloxacin, moxifloxacin, or ofloxacin (Brands include Factive, Cipro, Floxin, and Levaquin)    Cephalexin or any \"cef-\" antibiotic, such as cefazolin, cefdinir, cefuroxime, ceftriaxone, ceftazidime, or cefepime (Brands include Fortaz and Keflex)    Clindamycin or lincomycin (Brands include Cleocin and Lincocin)    Sulfa drugs, such as sulfamethoxazole, sulfisoxazole, sulfasalazine, or dapsone (Brands include Aczone, Bactrim, and Septra)   Have you had an allergic or bad reaction to any of these medications? Select all that apply.    No, not that I know of   Not selected:    Antifungals, such as clotrimazole, fluconazole, ketoconazole, miconazole, or itraconazole, (Diflucan, Extina, Lotrimin-AF, Micatin, Onmel, Zeasorb)    Antiparasitics, such as Permethrin or lindane (Nix)    Antivirals, such as acyclovir, penciclovir, or valacyclovir (Valtrex, Zovirax)    Griseofulvin (Shivani-PEG)    Terbinafine (Lamisil)   Have you had an allergic or bad reaction to any corticosteroids? - Examples of topical corticosteroids include: hydrocortisone (Cortizone), clobetasol (Temovate, Cormax), betamethasone (Diprolene), fluocinonide (Vanos), desonide (Desowen, Desonate, Tridesilon), triamcinolone (Kenalog, Trianex), alclometasone, and mometasone (Elocon). - Examples of oral corticosteroids include: prednisone and methylprednisolone (Medrol). Select one.    No, not that I know of   Not selected:    Yes   Have you had an allergic or bad reaction to any of these topical medications? A topical medication is a cream, gel, or ointment " that's put on the skin. Select all that apply.    No, not that I know of   Not selected:    Mupirocin (Bactroban)    Topical retinoids, such as adapalene, azelaic acid, tazarotene, or tretinoin (Azelex, Differin, Finacea, or Tazorac)    Pimecrolimus or tacrolimus (Elidel or Protopic)    Crisaborole (Eucrisa)   Have you had an allergic or bad reaction to any of these medications? Select all that apply.    No, not that I know of   Not selected:    Acetaminophen (Tylenol)    Ibuprofen (Advil, Motrin, Midol)    Diphenhydramine (Benadryl)    Cetirizine (Zyrtec)    Hydroxyzine pamoate (Vistaril)    Loratadine (Alavert, Claritin)    Fexofenadine (Allegra)    Ammonium lactate lotion (Amlactin, Lac-Hydrin, Laclotion, Ultravate)    Salicylic acid cream (Salacyn, Salex)    Urea cream (Aqua Care, Nutraplus)    Calcium acetate/aluminum sulfate (Domeboro)   Have you had an allergic or bad reaction to ondansetron (Zuplenz, Zofran ODT, Zofran)? Select one.    No, not that I know of   Not selected:    Yes   Have you ever had jaundice or liver problems as a result of taking amoxicillin-clavulanate (Augmentin)? Jaundice is a condition in which the skin and the whites of the eyes turn yellow. Select all that apply.    No, not that I know of   Not selected:    Yes, jaundice    Yes, liver problems   Do you need a doctor's note? A doctor's note confirms that you received care today and states when you can return to school or work. It does not contain information about your diagnosis or treatment plan. Your provider will make the final decision on whether to give you a doctor's note. Doctor's notes cannot be backdated. Select one.    No   Not selected:    Today only (1 day)    Today and tomorrow (2 days)    3 days   Is there anything you'd like to add about your symptoms? Please limit your comments to the symptoms asked about in this interview. If you include comments about other concerns, your provider may recommend that you be seen in  person.    I'm allergic to poison ivy and this appears to be the same as I've had in the past (usually every few years when I'm doing yard work. Usually, prednisone or some type of steroid is prescribed, and it clears up the rash after a few days.   ----------   Medical history   Medical history data does not currently exist for this patient.

## 2023-09-22 ENCOUNTER — TRANSCRIBE ORDERS (OUTPATIENT)
Dept: ADMINISTRATIVE | Facility: HOSPITAL | Age: 58
End: 2023-09-22
Payer: COMMERCIAL

## 2023-09-22 ENCOUNTER — LAB (OUTPATIENT)
Dept: LAB | Facility: HOSPITAL | Age: 58
End: 2023-09-22
Payer: COMMERCIAL

## 2023-09-22 DIAGNOSIS — R73.9 BLOOD GLUCOSE ELEVATED: ICD-10-CM

## 2023-09-22 DIAGNOSIS — E78.2 MIXED HYPERLIPIDEMIA: ICD-10-CM

## 2023-09-22 DIAGNOSIS — Z00.00 ROUTINE GENERAL MEDICAL EXAMINATION AT A HEALTH CARE FACILITY: Primary | ICD-10-CM

## 2023-09-22 DIAGNOSIS — Z00.00 ROUTINE GENERAL MEDICAL EXAMINATION AT A HEALTH CARE FACILITY: ICD-10-CM

## 2023-09-22 LAB
ALBUMIN SERPL-MCNC: 4.6 G/DL (ref 3.5–5.2)
ALBUMIN/GLOB SERPL: 2 G/DL
ALP SERPL-CCNC: 86 U/L (ref 39–117)
ALT SERPL W P-5'-P-CCNC: 44 U/L (ref 1–33)
ANION GAP SERPL CALCULATED.3IONS-SCNC: 13 MMOL/L (ref 5–15)
AST SERPL-CCNC: 23 U/L (ref 1–32)
BASOPHILS # BLD AUTO: 0 10*3/MM3 (ref 0–0.2)
BASOPHILS NFR BLD AUTO: 0.1 % (ref 0–1.5)
BILIRUB SERPL-MCNC: 0.8 MG/DL (ref 0–1.2)
BUN SERPL-MCNC: 11 MG/DL (ref 8–23)
BUN/CREAT SERPL: 17.2 (ref 7–25)
CALCIUM SPEC-SCNC: 9.8 MG/DL (ref 8.6–10.5)
CHLORIDE SERPL-SCNC: 102 MMOL/L (ref 98–107)
CHOLEST SERPL-MCNC: 184 MG/DL (ref 0–200)
CO2 SERPL-SCNC: 24 MMOL/L (ref 22–29)
CREAT SERPL-MCNC: 0.64 MG/DL (ref 0.57–1)
DEPRECATED RDW RBC AUTO: 43.3 FL (ref 37–54)
EGFRCR SERPLBLD CKD-EPI 2021: 101.3 ML/MIN/1.73
EOSINOPHIL # BLD AUTO: 0 10*3/MM3 (ref 0–0.4)
EOSINOPHIL NFR BLD AUTO: 0.1 % (ref 0.3–6.2)
ERYTHROCYTE [DISTWIDTH] IN BLOOD BY AUTOMATED COUNT: 12.7 % (ref 12.3–15.4)
GLOBULIN UR ELPH-MCNC: 2.3 GM/DL
GLUCOSE SERPL-MCNC: 148 MG/DL (ref 65–99)
HBA1C MFR BLD: 5.6 % (ref 4.8–5.6)
HCT VFR BLD AUTO: 43.9 % (ref 34–46.6)
HDLC SERPL-MCNC: 56 MG/DL (ref 40–60)
HGB BLD-MCNC: 15 G/DL (ref 12–15.9)
LDLC SERPL CALC-MCNC: 96 MG/DL (ref 0–100)
LDLC/HDLC SERPL: 1.61 {RATIO}
LYMPHOCYTES # BLD AUTO: 1.7 10*3/MM3 (ref 0.7–3.1)
LYMPHOCYTES NFR BLD AUTO: 14.6 % (ref 19.6–45.3)
MCH RBC QN AUTO: 31.5 PG (ref 26.6–33)
MCHC RBC AUTO-ENTMCNC: 34.3 G/DL (ref 31.5–35.7)
MCV RBC AUTO: 92 FL (ref 79–97)
MONOCYTES # BLD AUTO: 0.4 10*3/MM3 (ref 0.1–0.9)
MONOCYTES NFR BLD AUTO: 3.7 % (ref 5–12)
NEUTROPHILS NFR BLD AUTO: 81.5 % (ref 42.7–76)
NEUTROPHILS NFR BLD AUTO: 9.3 10*3/MM3 (ref 1.7–7)
NRBC BLD AUTO-RTO: 0.1 /100 WBC (ref 0–0.2)
PLATELET # BLD AUTO: 217 10*3/MM3 (ref 140–450)
PMV BLD AUTO: 8.6 FL (ref 6–12)
POTASSIUM SERPL-SCNC: 4 MMOL/L (ref 3.5–5.2)
PROT SERPL-MCNC: 6.9 G/DL (ref 6–8.5)
RBC # BLD AUTO: 4.77 10*6/MM3 (ref 3.77–5.28)
SODIUM SERPL-SCNC: 139 MMOL/L (ref 136–145)
TRIGL SERPL-MCNC: 189 MG/DL (ref 0–150)
TSH SERPL DL<=0.05 MIU/L-ACNC: 0.96 UIU/ML (ref 0.27–4.2)
VLDLC SERPL-MCNC: 32 MG/DL (ref 5–40)
WBC NRBC COR # BLD: 11.4 10*3/MM3 (ref 3.4–10.8)

## 2023-09-22 PROCEDURE — 85025 COMPLETE CBC W/AUTO DIFF WBC: CPT

## 2023-09-22 PROCEDURE — 83036 HEMOGLOBIN GLYCOSYLATED A1C: CPT

## 2023-09-22 PROCEDURE — 80053 COMPREHEN METABOLIC PANEL: CPT

## 2023-09-22 PROCEDURE — 84443 ASSAY THYROID STIM HORMONE: CPT

## 2023-09-22 PROCEDURE — 80061 LIPID PANEL: CPT

## 2023-09-22 PROCEDURE — 36415 COLL VENOUS BLD VENIPUNCTURE: CPT

## 2023-09-27 ENCOUNTER — TRANSCRIBE ORDERS (OUTPATIENT)
Dept: ADMINISTRATIVE | Facility: HOSPITAL | Age: 58
End: 2023-09-27
Payer: COMMERCIAL

## 2023-09-27 DIAGNOSIS — Z12.31 VISIT FOR SCREENING MAMMOGRAM: Primary | ICD-10-CM

## 2023-10-03 ENCOUNTER — HOSPITAL ENCOUNTER (OUTPATIENT)
Dept: MAMMOGRAPHY | Facility: HOSPITAL | Age: 58
Discharge: HOME | End: 2023-10-03
Admitting: OBSTETRICS & GYNECOLOGY
Payer: COMMERCIAL

## 2023-10-03 DIAGNOSIS — Z12.31 VISIT FOR SCREENING MAMMOGRAM: ICD-10-CM

## 2023-10-03 PROCEDURE — 77063 BREAST TOMOSYNTHESIS BI: CPT

## 2023-10-03 PROCEDURE — 77067 SCR MAMMO BI INCL CAD: CPT

## 2024-05-21 ENCOUNTER — APPOINTMENT (OUTPATIENT)
Dept: GENERAL RADIOLOGY | Facility: HOSPITAL | Age: 59
End: 2024-05-21
Payer: COMMERCIAL

## 2024-05-21 ENCOUNTER — HOSPITAL ENCOUNTER (EMERGENCY)
Facility: HOSPITAL | Age: 59
Discharge: HOME | End: 2024-05-21
Attending: EMERGENCY MEDICINE | Admitting: EMERGENCY MEDICINE
Payer: COMMERCIAL

## 2024-05-21 VITALS
TEMPERATURE: 98.5 F | OXYGEN SATURATION: 95 % | BODY MASS INDEX: 27.49 KG/M2 | RESPIRATION RATE: 15 BRPM | SYSTOLIC BLOOD PRESSURE: 147 MMHG | DIASTOLIC BLOOD PRESSURE: 80 MMHG | WEIGHT: 165 LBS | HEIGHT: 65 IN | HEART RATE: 68 BPM

## 2024-05-21 DIAGNOSIS — R07.9 CHEST PAIN, UNSPECIFIED TYPE: Primary | ICD-10-CM

## 2024-05-21 LAB
ANION GAP SERPL CALCULATED.3IONS-SCNC: 11.8 MMOL/L (ref 5–15)
APTT PPP: 27.8 SECONDS (ref 61–76.5)
BASOPHILS # BLD AUTO: 0.04 10*3/MM3 (ref 0–0.2)
BASOPHILS NFR BLD AUTO: 0.5 % (ref 0–1.5)
BUN SERPL-MCNC: 11 MG/DL (ref 8–23)
BUN/CREAT SERPL: 15.7 (ref 7–25)
CALCIUM SPEC-SCNC: 9.7 MG/DL (ref 8.6–10.5)
CHLORIDE SERPL-SCNC: 101 MMOL/L (ref 98–107)
CO2 SERPL-SCNC: 24.2 MMOL/L (ref 22–29)
CREAT SERPL-MCNC: 0.7 MG/DL (ref 0.57–1)
DEPRECATED RDW RBC AUTO: 42.1 FL (ref 37–54)
EGFRCR SERPLBLD CKD-EPI 2021: 98.5 ML/MIN/1.73
EOSINOPHIL # BLD AUTO: 0.06 10*3/MM3 (ref 0–0.4)
EOSINOPHIL NFR BLD AUTO: 0.8 % (ref 0.3–6.2)
ERYTHROCYTE [DISTWIDTH] IN BLOOD BY AUTOMATED COUNT: 12.6 % (ref 12.3–15.4)
GEN 5 2HR TROPONIN T REFLEX: 6 NG/L
GLUCOSE SERPL-MCNC: 104 MG/DL (ref 65–99)
HCT VFR BLD AUTO: 43.6 % (ref 34–46.6)
HGB BLD-MCNC: 14.5 G/DL (ref 12–15.9)
HOLD SPECIMEN: NORMAL
HOLD SPECIMEN: NORMAL
IMM GRANULOCYTES # BLD AUTO: 0.04 10*3/MM3 (ref 0–0.05)
IMM GRANULOCYTES NFR BLD AUTO: 0.5 % (ref 0–0.5)
INR PPP: 1.07 (ref 0.93–1.1)
LYMPHOCYTES # BLD AUTO: 1.97 10*3/MM3 (ref 0.7–3.1)
LYMPHOCYTES NFR BLD AUTO: 26.5 % (ref 19.6–45.3)
MCH RBC QN AUTO: 30.4 PG (ref 26.6–33)
MCHC RBC AUTO-ENTMCNC: 33.3 G/DL (ref 31.5–35.7)
MCV RBC AUTO: 91.4 FL (ref 79–97)
MONOCYTES # BLD AUTO: 0.76 10*3/MM3 (ref 0.1–0.9)
MONOCYTES NFR BLD AUTO: 10.2 % (ref 5–12)
NEUTROPHILS NFR BLD AUTO: 4.57 10*3/MM3 (ref 1.7–7)
NEUTROPHILS NFR BLD AUTO: 61.5 % (ref 42.7–76)
NRBC BLD AUTO-RTO: 0 /100 WBC (ref 0–0.2)
PLATELET # BLD AUTO: 222 10*3/MM3 (ref 140–450)
PMV BLD AUTO: 9.8 FL (ref 6–12)
POTASSIUM SERPL-SCNC: 3.7 MMOL/L (ref 3.5–5.2)
PROTHROMBIN TIME: 11.6 SECONDS (ref 9.6–11.7)
RBC # BLD AUTO: 4.77 10*6/MM3 (ref 3.77–5.28)
SODIUM SERPL-SCNC: 137 MMOL/L (ref 136–145)
TROPONIN T DELTA: 0 NG/L
TROPONIN T SERPL HS-MCNC: 6 NG/L
WBC NRBC COR # BLD AUTO: 7.44 10*3/MM3 (ref 3.4–10.8)
WHOLE BLOOD HOLD COAG: NORMAL
WHOLE BLOOD HOLD SPECIMEN: NORMAL

## 2024-05-21 PROCEDURE — 93005 ELECTROCARDIOGRAM TRACING: CPT

## 2024-05-21 PROCEDURE — 71045 X-RAY EXAM CHEST 1 VIEW: CPT

## 2024-05-21 PROCEDURE — 80048 BASIC METABOLIC PNL TOTAL CA: CPT | Performed by: EMERGENCY MEDICINE

## 2024-05-21 PROCEDURE — 36415 COLL VENOUS BLD VENIPUNCTURE: CPT

## 2024-05-21 PROCEDURE — 84484 ASSAY OF TROPONIN QUANT: CPT | Performed by: EMERGENCY MEDICINE

## 2024-05-21 PROCEDURE — 85610 PROTHROMBIN TIME: CPT | Performed by: EMERGENCY MEDICINE

## 2024-05-21 PROCEDURE — 85730 THROMBOPLASTIN TIME PARTIAL: CPT | Performed by: EMERGENCY MEDICINE

## 2024-05-21 PROCEDURE — 93005 ELECTROCARDIOGRAM TRACING: CPT | Performed by: EMERGENCY MEDICINE

## 2024-05-21 PROCEDURE — 85025 COMPLETE CBC W/AUTO DIFF WBC: CPT | Performed by: EMERGENCY MEDICINE

## 2024-05-21 PROCEDURE — 99284 EMERGENCY DEPT VISIT MOD MDM: CPT

## 2024-05-21 RX ORDER — ASPIRIN 325 MG
325 TABLET ORAL ONCE
Status: COMPLETED | OUTPATIENT
Start: 2024-05-21 | End: 2024-05-21

## 2024-05-21 RX ORDER — SODIUM CHLORIDE 0.9 % (FLUSH) 0.9 %
10 SYRINGE (ML) INJECTION AS NEEDED
Status: DISCONTINUED | OUTPATIENT
Start: 2024-05-21 | End: 2024-05-21 | Stop reason: HOSPADM

## 2024-05-21 RX ADMIN — ASPIRIN 325 MG ORAL TABLET 325 MG: 325 PILL ORAL at 15:54

## 2024-05-21 NOTE — ED NOTES
Pt ambulates to ED [facility]. Pt reports midsternal chest pain x 3 hours that started while she was sitting at her computer. Pt reports that pain has been constant and describes the pain as tightness. Rates her pain at a 6/10 and has not taken anything for her symptoms. Pt denies soa, abdominal pain, nausea, vomiting and diarrhea. Pt says she has had some dizziness that started when her chest pain started. Pt also reports left posterior shoulder pain x 3-4 days, she denies any trauma to the area and reports that she has tried stretching the shoulder and feels like she needs to pop it but has not had any changes in pain. Pt has no other complaints at this time.

## 2024-05-21 NOTE — ED PROVIDER NOTES
Subjective   History of Present Illness  Chief complaint: Chest pain    59-year-old female presents with chest pain.  Patient reports history of hyperlipidemia.  She also reports a strong family history of heart disease.  She states she has never had any heart problems herself.  She has never had a stress test.  She states chest pain started about 2 hours prior to arrival.  Pain is in the left chest and is described as a tightness sensation.  She also states she has had a pain under her left shoulder blade for the past 3 or 4 days.  She denies any significant shortness of breath.  She has had no diaphoresis, dizziness, palpitations, nausea.  She denies any pain or swelling in her legs.  No known alleviating or exacerbating factors.        Review of Systems   Constitutional:  Negative for diaphoresis and fever.   HENT:  Negative for congestion.    Respiratory:  Negative for cough and shortness of breath.    Cardiovascular:  Positive for chest pain.   Gastrointestinal:  Negative for abdominal pain, nausea and vomiting.   Musculoskeletal:  Positive for back pain.   Neurological:  Negative for headaches.   Psychiatric/Behavioral:  Negative for confusion.        Past Medical History:   Diagnosis Date    Arthritis        Allergies   Allergen Reactions    Codeine Nausea Only       Past Surgical History:   Procedure Laterality Date    BLADDER REPAIR  2001    COLONOSCOPY N/A 1/6/2020    Procedure: COLONOSCOPY WITH POLYPECTOMY X2;  Surgeon: Miah Toribio MD;  Location: [facility] ENDOSCOPY;  Service: Gastroenterology    COLONOSCOPY N/A 6/7/2023    Procedure: COLONOSCOPY;  Surgeon: Louann Thompson MD;  Location: [facility] ENDOSCOPY;  Service: Gastroenterology;  Laterality: N/A;  POST: DIVERTICULOSIS, HEMORRHOIDS    KNEE ARTHROSCOPY Left 1/25/2017    Procedure: LEFT KNEE ARTHROSCOPY WITH CHONDROPLASTY  AND  DRILLING;  Surgeon: Colt Fournier MD;  [place] [facility] [place] [facility] [place];  Service:     KNEE MENISCAL REPAIR Right     SHOULDER  "ARTHROSCOPY W/ ROTATOR CUFF REPAIR Right 7/24/2020    Procedure: SHOULDER ARTHROSCOPY WITH ROTATOR CUFF REPAIR, decompression, capsular release  ;  Surgeon: Jamal Hylton MD;  Location: [facility] MAIN OR;  Service: Orthopedics;  Laterality: Right;    TOOTH EXTRACTION         Family History   Problem Relation Age of Onset    Diabetes Mother     Lupus Mother     COPD Father     Lung cancer Father        Social History     Socioeconomic History    Marital status:    Tobacco Use    Smoking status: Never    Smokeless tobacco: Never   Vaping Use    Vaping status: Never Used   Substance and Sexual Activity    Alcohol use: Yes     Alcohol/week: 5.0 standard drinks of alcohol     Types: 2 Shots of liquor, 3 Cans of beer per week    Drug use: No    Sexual activity: Defer       /80   Pulse 68   Temp 98.5 °F (36.9 °C)   Resp 15   Ht 165.1 cm (65\")   Wt 74.8 kg (165 lb)   LMP 12/10/2016 (Exact Date)   SpO2 95%   BMI 27.46 kg/m²       Objective   Physical Exam  Vitals and nursing note reviewed.   Constitutional:       Appearance: She is well-developed.   HENT:      Head: Normocephalic and atraumatic.   Cardiovascular:      Rate and Rhythm: Normal rate and regular rhythm.      Heart sounds: Normal heart sounds.   Pulmonary:      Effort: Pulmonary effort is normal. No respiratory distress.      Breath sounds: Normal breath sounds.   Abdominal:      Palpations: Abdomen is soft.      Tenderness: There is no abdominal tenderness.   Musculoskeletal:      Right lower leg: No tenderness. No edema.      Left lower leg: No tenderness. No edema.   Skin:     General: Skin is warm and dry.   Neurological:      Mental Status: She is alert and oriented to person, place, and time.         Procedures           ED Course      My interpretation of EKG shows sinus rhythm, rate of 64, no ST elevation          HEART Score: 2                  Results for orders placed or performed during the hospital encounter of 05/21/24 "   Basic Metabolic Panel    Specimen: Arm, Left; Blood   Result Value Ref Range    Glucose 104 (H) 65 - 99 mg/dL    BUN 11 8 - 23 mg/dL    Creatinine 0.70 0.57 - 1.00 mg/dL    Sodium 137 136 - 145 mmol/L    Potassium 3.7 3.5 - 5.2 mmol/L    Chloride 101 98 - 107 mmol/L    CO2 24.2 22.0 - 29.0 mmol/L    Calcium 9.7 8.6 - 10.5 mg/dL    BUN/Creatinine Ratio 15.7 7.0 - 25.0    Anion Gap 11.8 5.0 - 15.0 mmol/L    eGFR 98.5 >60.0 mL/min/1.73   Protime-INR    Specimen: Arm, Left; Blood   Result Value Ref Range    Protime 11.6 9.6 - 11.7 Seconds    INR 1.07 0.93 - 1.10   aPTT    Specimen: Arm, Left; Blood   Result Value Ref Range    PTT 27.8 (L) 61.0 - 76.5 seconds   High Sensitivity Troponin T    Specimen: Arm, Left; Blood   Result Value Ref Range    HS Troponin T 6 <14 ng/L   CBC Auto Differential    Specimen: Arm, Left; Blood   Result Value Ref Range    WBC 7.44 3.40 - 10.80 10*3/mm3    RBC 4.77 3.77 - 5.28 10*6/mm3    Hemoglobin 14.5 12.0 - 15.9 g/dL    Hematocrit 43.6 34.0 - 46.6 %    MCV 91.4 79.0 - 97.0 fL    MCH 30.4 26.6 - 33.0 pg    MCHC 33.3 31.5 - 35.7 g/dL    RDW 12.6 12.3 - 15.4 %    RDW-SD 42.1 37.0 - 54.0 fl    MPV 9.8 6.0 - 12.0 fL    Platelets 222 140 - 450 10*3/mm3    Neutrophil % 61.5 42.7 - 76.0 %    Lymphocyte % 26.5 19.6 - 45.3 %    Monocyte % 10.2 5.0 - 12.0 %    Eosinophil % 0.8 0.3 - 6.2 %    Basophil % 0.5 0.0 - 1.5 %    Immature Grans % 0.5 0.0 - 0.5 %    Neutrophils, Absolute 4.57 1.70 - 7.00 10*3/mm3    Lymphocytes, Absolute 1.97 0.70 - 3.10 10*3/mm3    Monocytes, Absolute 0.76 0.10 - 0.90 10*3/mm3    Eosinophils, Absolute 0.06 0.00 - 0.40 10*3/mm3    Basophils, Absolute 0.04 0.00 - 0.20 10*3/mm3    Immature Grans, Absolute 0.04 0.00 - 0.05 10*3/mm3    nRBC 0.0 0.0 - 0.2 /100 WBC   High Sensitivity Troponin T 2Hr    Specimen: Arm, Left; Blood   Result Value Ref Range    HS Troponin T 6 <14 ng/L    Troponin T Delta 0 >=-4 - <+4 ng/L   ECG 12 Lead Chest Pain   Result Value Ref Range    QT Interval  419 ms    QTC Interval 433 ms   Green Top (Gel)   Result Value Ref Range    Extra Tube Hold for add-ons.    Lavender Top   Result Value Ref Range    Extra Tube hold for add-on    Gold Top - SST   Result Value Ref Range    Extra Tube Hold for add-ons.    Light Blue Top   Result Value Ref Range    Extra Tube Hold for add-ons.      XR Chest 1 View    Result Date: 5/21/2024  Impression: No acute process. Electronically Signed: Shaan Watson MD  5/21/2024 3:39 PM EDT  Workstation ID: [id number]               Medical Decision Making  Amount and/or Complexity of Data Reviewed  Labs: ordered.  Radiology: ordered.  ECG/medicine tests: ordered.    Risk  OTC drugs.      Patient had the above evaluation.  Results were discussed with the patient.  My interpretation of chest x-ray shows no infiltrate or effusion.  EKG shows no acute ischemia.  Troponin is negative x 2.  White blood cell count is normal.  BMP is unremarkable.  Patient remains well-appearing in the emergency room.  She was given aspirin.  We discussed admission for possible stress test but patient has declined.  She states she will follow-up with her primary doctor for possible outpatient stress test.  Heart score is low risk.      Final diagnoses:   Chest pain, unspecified type       ED Disposition  ED Disposition       ED Disposition   Discharge    Condition   Stable    Comment   --               Ree Rondon MD  [address] [facility] [place] [id number] [phone]    Call in 1 day           Medication List      No changes were made to your prescriptions during this visit.            Isauro Arthur MD  05/21/24 [id number]

## 2024-05-22 LAB
QT INTERVAL: 419 MS
QTC INTERVAL: 433 MS

## 2024-06-26 NOTE — H&P (VIEW-ONLY)
"     Patient ID: Alem Arzate is a 55 y.o. female.    Chief Complaint:    Chief Complaint   Patient presents with   • Right Shoulder - Initial Evaluation       HPI:  Alem is a 55-year-old female here in consultation from Dr. Uribe for about 6 to 8 weeks of right shoulder pain that began after lifting some items.  She is developed significant pain in the impingement area worse with overhead activity and at night.  She has had physical therapy and a cortisone injection with minimal relief.  Pain is sharp and a 7/10 and better with rest  Past Medical History:   Diagnosis Date   • Acute meniscal tear of left knee    • Arthritis        Past Surgical History:   Procedure Laterality Date   • BLADDER REPAIR  2001   • COLONOSCOPY N/A 1/6/2020    Procedure: COLONOSCOPY WITH POLYPECTOMY X2;  Surgeon: Miah Toribio MD;  Location: [facility] ENDOSCOPY;  Service: Gastroenterology   • KNEE ARTHROSCOPY Left 1/25/2017    Procedure: LEFT KNEE ARTHROSCOPY WITH CHONDROPLASTY  AND  DRILLING;  Surgeon: Colt Fournier MD;  [place] [facility] [place] [facility] [place]  Service:    • KNEE MENISCAL REPAIR Right    • TOOTH EXTRACTION         Family History   Problem Relation Age of Onset   • Diabetes Mother    • Lupus Mother    • COPD Father    • Lung cancer Father           Social History     Occupational History   • Not on file   Tobacco Use   • Smoking status: Never Smoker   • Smokeless tobacco: Never Used   Substance and Sexual Activity   • Alcohol use: Yes     Alcohol/week: 4.0 standard drinks     Types: 2 Cans of beer, 2 Shots of liquor per week     Frequency: 2-4 times a month     Drinks per session: 3 or 4     Binge frequency: Less than monthly   • Drug use: No   • Sexual activity: Defer      Review of Systems   Cardiovascular: Negative for chest pain.   Musculoskeletal: Positive for arthralgias.       Objective:    /80 (BP Location: Left arm, Patient Position: Sitting)   Pulse 75   Temp 97.5 °F (36.4 °C)   Resp 16   Ht 167.6 cm (66\")   " Wt 86.6 kg (191 lb)   SpO2 97%   BMI 30.83 kg/m²     Physical Examination:  She is a pleasant female in no distress. She is alert and oriented x3 and appears her stated age.  Right shoulder demonstrates no scars and no atrophy.  She has tenderness in the impingement area and bicep groove.  Passive elevation 160 degrees abduction 130 degrees external rotation 30 degrees internal rotation is S1.  She has pain weakness on Speed, [place], supraspinatus testing.  Belly press and liftoff are 5/5 and 4/5.Sensory and motor exam are intact all distributions. Radial pulse is palpable and capillary refill is less than two seconds to all digits    Imaging:  MRI demonstrates high-grade near full-thickness tear of the supraspinatus and partial subscapularis tear with bicep tendinitis    Assessment:  Right shoulder high-grade partial cuff tear and bicep tendinitis    Plan:  Treatment options discussed.  In light of her conservative treatment and high-grade tear further options would be continued therapy possible PRP injection versus shoulder arthroscopy with cuff repair and possible bicep tenodesis.  She would like to think about her options and let me know.Risks and benefits, specifically risks of bleeding, scar, infection, fracture, stiffness, retear, nerve, tendon, artery damage, the need for further surgery, DVT, and loss of life or limb and rehab were discussed. All questions were answered and addressed.          Disclaimer: Please note that areas of this note were completed with computer voice recognition software.  Quite often unanticipated grammatical, syntax, homophones, and other interpretive errors are inadvertently transcribed by the computer software. Please excuse any errors that have escaped final proofreading.   Detail Level: Simple Render Risk Assessment In Note?: no Additional Notes: Treatment goal is to achieve a reduction in inflammatory papules and pustules as well as a reduction in comedones.

## 2024-10-07 ENCOUNTER — TRANSCRIBE ORDERS (OUTPATIENT)
Dept: ADMINISTRATIVE | Facility: HOSPITAL | Age: 59
End: 2024-10-07
Payer: COMMERCIAL

## 2024-10-07 DIAGNOSIS — Z12.31 ENCOUNTER FOR SCREENING MAMMOGRAM FOR BREAST CANCER: Primary | ICD-10-CM

## 2024-10-15 ENCOUNTER — HOSPITAL ENCOUNTER (OUTPATIENT)
Dept: MAMMOGRAPHY | Facility: HOSPITAL | Age: 59
Discharge: HOME | End: 2024-10-15
Admitting: OBSTETRICS & GYNECOLOGY
Payer: COMMERCIAL

## 2024-10-15 DIAGNOSIS — Z12.31 ENCOUNTER FOR SCREENING MAMMOGRAM FOR BREAST CANCER: ICD-10-CM

## 2024-10-15 PROCEDURE — 77063 BREAST TOMOSYNTHESIS BI: CPT

## 2024-10-15 PROCEDURE — 77067 SCR MAMMO BI INCL CAD: CPT

## 2024-10-16 ENCOUNTER — LAB (OUTPATIENT)
Dept: LAB | Facility: HOSPITAL | Age: 59
End: 2024-10-16
Payer: COMMERCIAL

## 2024-10-16 ENCOUNTER — TRANSCRIBE ORDERS (OUTPATIENT)
Dept: ADMINISTRATIVE | Facility: HOSPITAL | Age: 59
End: 2024-10-16
Payer: COMMERCIAL

## 2024-10-16 DIAGNOSIS — D72.829 LEUKOCYTOSIS, UNSPECIFIED TYPE: Primary | ICD-10-CM

## 2024-10-16 DIAGNOSIS — D72.829 LEUKOCYTOSIS, UNSPECIFIED TYPE: ICD-10-CM

## 2024-10-16 DIAGNOSIS — E78.2 MIXED HYPERLIPIDEMIA: ICD-10-CM

## 2024-10-16 DIAGNOSIS — Z00.00 ROUTINE GENERAL MEDICAL EXAMINATION AT A HEALTH CARE FACILITY: ICD-10-CM

## 2024-10-16 DIAGNOSIS — R73.9 BLOOD GLUCOSE ELEVATED: ICD-10-CM

## 2024-10-16 DIAGNOSIS — F51.01 PRIMARY INSOMNIA: ICD-10-CM

## 2024-10-16 LAB
25(OH)D3 SERPL-MCNC: 35.5 NG/ML (ref 30–100)
ALBUMIN SERPL-MCNC: 4.9 G/DL (ref 3.5–5.2)
ALBUMIN/GLOB SERPL: 1.8 G/DL
ALP SERPL-CCNC: 92 U/L (ref 39–117)
ALT SERPL W P-5'-P-CCNC: 16 U/L (ref 1–33)
ANION GAP SERPL CALCULATED.3IONS-SCNC: 10.3 MMOL/L (ref 5–15)
AST SERPL-CCNC: 17 U/L (ref 1–32)
BASOPHILS # BLD AUTO: 0.05 10*3/MM3 (ref 0–0.2)
BASOPHILS NFR BLD AUTO: 0.4 % (ref 0–1.5)
BILIRUB SERPL-MCNC: 0.7 MG/DL (ref 0–1.2)
BUN SERPL-MCNC: 8 MG/DL (ref 8–23)
BUN/CREAT SERPL: 11.3 (ref 7–25)
CALCIUM SPEC-SCNC: 10 MG/DL (ref 8.6–10.5)
CHLORIDE SERPL-SCNC: 101 MMOL/L (ref 98–107)
CHOLEST SERPL-MCNC: 161 MG/DL (ref 0–200)
CO2 SERPL-SCNC: 26.7 MMOL/L (ref 22–29)
CREAT SERPL-MCNC: 0.71 MG/DL (ref 0.57–1)
DEPRECATED RDW RBC AUTO: 39.3 FL (ref 37–54)
EGFRCR SERPLBLD CKD-EPI 2021: 96.9 ML/MIN/1.73
EOSINOPHIL # BLD AUTO: 0.04 10*3/MM3 (ref 0–0.4)
EOSINOPHIL NFR BLD AUTO: 0.3 % (ref 0.3–6.2)
ERYTHROCYTE [DISTWIDTH] IN BLOOD BY AUTOMATED COUNT: 11.9 % (ref 12.3–15.4)
GLOBULIN UR ELPH-MCNC: 2.8 GM/DL
GLUCOSE SERPL-MCNC: 119 MG/DL (ref 65–99)
HBA1C MFR BLD: 5.34 % (ref 4.8–5.6)
HCT VFR BLD AUTO: 44.8 % (ref 34–46.6)
HDLC SERPL-MCNC: 62 MG/DL (ref 40–60)
HGB BLD-MCNC: 15.4 G/DL (ref 12–15.9)
IMM GRANULOCYTES # BLD AUTO: 0.05 10*3/MM3 (ref 0–0.05)
IMM GRANULOCYTES NFR BLD AUTO: 0.4 % (ref 0–0.5)
LDLC SERPL CALC-MCNC: 79 MG/DL (ref 0–100)
LDLC/HDLC SERPL: 1.23 {RATIO}
LYMPHOCYTES # BLD AUTO: 2.22 10*3/MM3 (ref 0.7–3.1)
LYMPHOCYTES NFR BLD AUTO: 15.9 % (ref 19.6–45.3)
MCH RBC QN AUTO: 31.4 PG (ref 26.6–33)
MCHC RBC AUTO-ENTMCNC: 34.4 G/DL (ref 31.5–35.7)
MCV RBC AUTO: 91.4 FL (ref 79–97)
MONOCYTES # BLD AUTO: 0.9 10*3/MM3 (ref 0.1–0.9)
MONOCYTES NFR BLD AUTO: 6.5 % (ref 5–12)
NEUTROPHILS NFR BLD AUTO: 10.66 10*3/MM3 (ref 1.7–7)
NEUTROPHILS NFR BLD AUTO: 76.5 % (ref 42.7–76)
NRBC BLD AUTO-RTO: 0 /100 WBC (ref 0–0.2)
PLATELET # BLD AUTO: 275 10*3/MM3 (ref 140–450)
PMV BLD AUTO: 10.2 FL (ref 6–12)
POTASSIUM SERPL-SCNC: 4.3 MMOL/L (ref 3.5–5.2)
PROT SERPL-MCNC: 7.7 G/DL (ref 6–8.5)
RBC # BLD AUTO: 4.9 10*6/MM3 (ref 3.77–5.28)
SODIUM SERPL-SCNC: 138 MMOL/L (ref 136–145)
T4 FREE SERPL-MCNC: 1.08 NG/DL (ref 0.93–1.7)
TRIGL SERPL-MCNC: 114 MG/DL (ref 0–150)
TSH SERPL DL<=0.05 MIU/L-ACNC: 1.68 UIU/ML (ref 0.27–4.2)
VLDLC SERPL-MCNC: 20 MG/DL (ref 5–40)
WBC NRBC COR # BLD AUTO: 13.92 10*3/MM3 (ref 3.4–10.8)

## 2024-10-16 PROCEDURE — 84443 ASSAY THYROID STIM HORMONE: CPT

## 2024-10-16 PROCEDURE — 82306 VITAMIN D 25 HYDROXY: CPT

## 2024-10-16 PROCEDURE — 83036 HEMOGLOBIN GLYCOSYLATED A1C: CPT

## 2024-10-16 PROCEDURE — 80053 COMPREHEN METABOLIC PANEL: CPT

## 2024-10-16 PROCEDURE — 36415 COLL VENOUS BLD VENIPUNCTURE: CPT

## 2024-10-16 PROCEDURE — 84439 ASSAY OF FREE THYROXINE: CPT

## 2024-10-16 PROCEDURE — 80061 LIPID PANEL: CPT

## 2024-10-16 PROCEDURE — 85025 COMPLETE CBC W/AUTO DIFF WBC: CPT

## (undated) DEVICE — UNDERCAST PADDING: Brand: DEROYAL

## (undated) DEVICE — ADHS LIQ MASTISOL 2/3ML

## (undated) DEVICE — GLV SURG TRIUMPH CLASSIC PF LTX 8 STRL

## (undated) DEVICE — PK SHLDR ARTHROSCOPY 50

## (undated) DEVICE — SUT ETHLN 3/0 PC5 18IN 1893G

## (undated) DEVICE — PK ARTHSCP 40

## (undated) DEVICE — GLV SURG DERMASSURE GRN LF PF 8.5

## (undated) DEVICE — 3M™ STERI-STRIP™ REINFORCED ADHESIVE SKIN CLOSURES, R1547, 1/2 IN X 4 IN (12 MM X 100 MM), 6 STRIPS/ENVELOPE: Brand: 3M™ STERI-STRIP™

## (undated) DEVICE — SPNG GZ AVANT 6PLY 4X4IN STRL PK/2

## (undated) DEVICE — PK ENDO GI 50

## (undated) DEVICE — TBG ARTHSCP DUALWAVE

## (undated) DEVICE — GLV SURG SENSICARE PI ORTHO SZ8 LF STRL

## (undated) DEVICE — 4.5 MM FULL RADIUS STRAIGHT                                    BLADES, POWER/EP-1, YELLOW, PACKAGED                                    6 PER BOX, STERILE: Brand: DYONICS

## (undated) DEVICE — BLD SHAVER EXCALIBUR CRV 4MM

## (undated) DEVICE — TBG ARTHSCP PT W CONN/REDUC 8FT

## (undated) DEVICE — CANN PASSPORT BUTN 8MM 3CM

## (undated) DEVICE — PAD,ABDOMINAL,5"X9",STERILE,LF,1/PK: Brand: MEDLINE INDUSTRIES, INC.

## (undated) DEVICE — CUFF SCD HEMOFORCE SEQ CALF STD MD

## (undated) DEVICE — TUBING, SUCTION, 1/4" X 12', STRAIGHT: Brand: MEDLINE

## (undated) DEVICE — ABL ASP APOLLO RF XL 90D

## (undated) DEVICE — GLV SURG SIGNATURE ESSENTIAL PF LTX SZ8

## (undated) DEVICE — TBG ARTHSCP PUMP W CONN/REDUC 8FT

## (undated) DEVICE — PK PROC TURNOVER

## (undated) DEVICE — BUR RND COOL CUT 8FLUT 4MM 13CM

## (undated) DEVICE — TRAP WIDEEYE POLYP

## (undated) DEVICE — CANN TRPL DAM 7X7MM

## (undated) DEVICE — GLV SURG SIGNATURE ESSENTIAL PF LTX SZ8.5

## (undated) DEVICE — DRSNG TELFA PAD NONADH STR 1S 3X8IN

## (undated) DEVICE — OCCLUSIVE GAUZE STRIP,3% BISMUTH TRIBROMOPHENATE IN PETROLATUM BLEND: Brand: XEROFORM

## (undated) DEVICE — SUTURELASSO CRV 25D LT

## (undated) DEVICE — SUT VIC 0 CT1 36IN J946H

## (undated) DEVICE — SNAR POLYP CAPTIVATOR HEX 27MM 240CM

## (undated) DEVICE — GLV SURG BIOGEL LTX PF 8

## (undated) DEVICE — SKIN PREP TRAY W/CHG: Brand: MEDLINE INDUSTRIES, INC.

## (undated) DEVICE — SUTURELASSO CRV 25D RT

## (undated) DEVICE — BNDG ESMARK STRL 6INX12FT LF

## (undated) DEVICE — SUT PDS 0 CT 36IN DYED Z358T

## (undated) DEVICE — PAPR PRNT PK SONY W RIBN UPC55